# Patient Record
Sex: FEMALE | Race: WHITE | Employment: OTHER | URBAN - METROPOLITAN AREA
[De-identification: names, ages, dates, MRNs, and addresses within clinical notes are randomized per-mention and may not be internally consistent; named-entity substitution may affect disease eponyms.]

---

## 2017-02-22 ENCOUNTER — TELEPHONE (OUTPATIENT)
Dept: MRI IMAGING | Age: 82
End: 2017-02-22

## 2017-02-22 ENCOUNTER — HOSPITAL ENCOUNTER (OUTPATIENT)
Dept: CT IMAGING | Age: 82
Discharge: HOME | End: 2017-02-22
Attending: INTERNAL MEDICINE
Payer: MEDICARE

## 2017-02-22 DIAGNOSIS — R91.1 LUNG NODULE: ICD-10-CM

## 2017-02-22 PROCEDURE — 71250 CT THORAX DX C-: CPT

## 2017-02-28 NOTE — PROGRESS NOTES
This was ordered by Dr. Deras Matters appointment with you on 3/3/17--will defer any orders to you. Thank you.

## 2017-03-03 PROBLEM — R91.8 LUNG NODULES: Status: ACTIVE | Noted: 2017-03-03

## 2017-03-23 ENCOUNTER — HOSPITAL ENCOUNTER (OUTPATIENT)
Dept: PET IMAGING | Age: 82
Discharge: HOME | End: 2017-03-23
Payer: MEDICARE

## 2017-03-23 DIAGNOSIS — Z85.3 HISTORY OF BREAST CANCER: Chronic | ICD-10-CM

## 2017-03-23 DIAGNOSIS — Z85.850 HX OF THYROID CANCER: ICD-10-CM

## 2017-03-23 DIAGNOSIS — R91.1 LUNG NODULE: ICD-10-CM

## 2017-03-23 DIAGNOSIS — R91.1 PULMONARY NODULE, LEFT: Chronic | ICD-10-CM

## 2017-03-23 PROCEDURE — A9552 F18 FDG: HCPCS

## 2017-03-23 PROCEDURE — 74011636320 HC RX REV CODE- 636/320: Performed by: NURSE PRACTITIONER

## 2017-03-23 RX ADMIN — DIATRIZOATE MEGLUMINE AND DIATRIZOATE SODIUM 10 ML: 660; 100 LIQUID ORAL; RECTAL at 09:10

## 2017-03-27 NOTE — PROGRESS NOTES
Dr. Sahra Gabriel, please review PET scan in the 80year old. Previously seen by Dr. Estrella Rosario due to nodules, had chest CT 2/2017 that was ordered by him, showed enlargement, thus PET CT was obtained. Please advise re:  Bx, assuming that she wants that done and is agreeable to tx if found malignant. I believe that she and family wanted to proceed with the knowledge of those possibilities. I will call her with recommendations.

## 2017-05-16 ENCOUNTER — HOSPITAL ENCOUNTER (OUTPATIENT)
Dept: CT IMAGING | Age: 82
Discharge: HOME | End: 2017-05-16
Attending: INTERNAL MEDICINE | Admitting: INTERNAL MEDICINE
Payer: MEDICARE

## 2017-05-16 ENCOUNTER — APPOINTMENT (OUTPATIENT)
Dept: GENERAL RADIOLOGY | Age: 82
End: 2017-05-16
Attending: INTERNAL MEDICINE
Payer: MEDICARE

## 2017-05-16 VITALS
HEIGHT: 60 IN | SYSTOLIC BLOOD PRESSURE: 121 MMHG | BODY MASS INDEX: 26.11 KG/M2 | TEMPERATURE: 97.7 F | WEIGHT: 133 LBS | HEART RATE: 61 BPM | DIASTOLIC BLOOD PRESSURE: 59 MMHG | RESPIRATION RATE: 16 BRPM | OXYGEN SATURATION: 92 %

## 2017-05-16 DIAGNOSIS — R91.1 LUNG NODULE: ICD-10-CM

## 2017-05-16 DIAGNOSIS — R91.8 LUNG NODULES: ICD-10-CM

## 2017-05-16 PROCEDURE — A4648 IMPLANTABLE TISSUE MARKER: HCPCS | Performed by: INTERNAL MEDICINE

## 2017-05-16 PROCEDURE — 74011000250 HC RX REV CODE- 250: Performed by: INTERNAL MEDICINE

## 2017-05-16 PROCEDURE — 88112 CYTOPATH CELL ENHANCE TECH: CPT | Performed by: INTERNAL MEDICINE

## 2017-05-16 PROCEDURE — 31626 BRONCHOSCOPY W/MARKERS: CPT | Performed by: INTERNAL MEDICINE

## 2017-05-16 PROCEDURE — 76040000028: Performed by: INTERNAL MEDICINE

## 2017-05-16 PROCEDURE — 31628 BRONCHOSCOPY/LUNG BX EACH: CPT | Performed by: INTERNAL MEDICINE

## 2017-05-16 PROCEDURE — 77030034962: Performed by: INTERNAL MEDICINE

## 2017-05-16 PROCEDURE — 99153 MOD SED SAME PHYS/QHP EA: CPT | Performed by: INTERNAL MEDICINE

## 2017-05-16 PROCEDURE — 77030031404 HC PTCH SENS SD DISP SPDM -A: Performed by: INTERNAL MEDICINE

## 2017-05-16 PROCEDURE — 77030021921 HC NDL BIOP SD SPDM -E: Performed by: INTERNAL MEDICINE

## 2017-05-16 PROCEDURE — 31623 DX BRONCHOSCOPE/BRUSH: CPT | Performed by: INTERNAL MEDICINE

## 2017-05-16 PROCEDURE — 31654 BRONCH EBUS IVNTJ PERPH LES: CPT | Performed by: INTERNAL MEDICINE

## 2017-05-16 PROCEDURE — 88305 TISSUE EXAM BY PATHOLOGIST: CPT | Performed by: INTERNAL MEDICINE

## 2017-05-16 PROCEDURE — 74011250636 HC RX REV CODE- 250/636: Performed by: INTERNAL MEDICINE

## 2017-05-16 PROCEDURE — 77030028571 HC CATH ENDOBRNCH DISP BIOP KT SPMD -G1: Performed by: INTERNAL MEDICINE

## 2017-05-16 PROCEDURE — 77030012699 HC VLV SUC CNTRL OCOA -A: Performed by: INTERNAL MEDICINE

## 2017-05-16 PROCEDURE — 99152 MOD SED SAME PHYS/QHP 5/>YRS: CPT | Performed by: INTERNAL MEDICINE

## 2017-05-16 PROCEDURE — 31627 NAVIGATIONAL BRONCHOSCOPY: CPT | Performed by: INTERNAL MEDICINE

## 2017-05-16 PROCEDURE — 88173 CYTOPATH EVAL FNA REPORT: CPT | Performed by: INTERNAL MEDICINE

## 2017-05-16 PROCEDURE — 77030021922 HC FCPS BIOP SD DISP SPDM -D: Performed by: INTERNAL MEDICINE

## 2017-05-16 PROCEDURE — 31629 BRONCHOSCOPY/NEEDLE BX EACH: CPT | Performed by: INTERNAL MEDICINE

## 2017-05-16 PROCEDURE — 88172 CYTP DX EVAL FNA 1ST EA SITE: CPT | Performed by: INTERNAL MEDICINE

## 2017-05-16 PROCEDURE — 74011250636 HC RX REV CODE- 250/636

## 2017-05-16 PROCEDURE — 77030009046 HC CATH BRNCH BLLN OCOA -B: Performed by: INTERNAL MEDICINE

## 2017-05-16 PROCEDURE — 88177 CYTP FNA EVAL EA ADDL: CPT | Performed by: INTERNAL MEDICINE

## 2017-05-16 PROCEDURE — 71250 CT THORAX DX C-: CPT

## 2017-05-16 RX ORDER — LIDOCAINE HYDROCHLORIDE 40 MG/ML
SOLUTION TOPICAL AS NEEDED
Status: DISCONTINUED | OUTPATIENT
Start: 2017-05-16 | End: 2017-05-16 | Stop reason: HOSPADM

## 2017-05-16 RX ORDER — SODIUM CHLORIDE 9 MG/ML
25 INJECTION, SOLUTION INTRAVENOUS CONTINUOUS
Status: DISCONTINUED | OUTPATIENT
Start: 2017-05-16 | End: 2017-05-16 | Stop reason: HOSPADM

## 2017-05-16 RX ORDER — SODIUM CHLORIDE 0.9 % (FLUSH) 0.9 %
5-10 SYRINGE (ML) INJECTION AS NEEDED
Status: DISCONTINUED | OUTPATIENT
Start: 2017-05-16 | End: 2017-05-16 | Stop reason: HOSPADM

## 2017-05-16 RX ORDER — SODIUM CHLORIDE 0.9 % (FLUSH) 0.9 %
5-10 SYRINGE (ML) INJECTION EVERY 8 HOURS
Status: DISCONTINUED | OUTPATIENT
Start: 2017-05-16 | End: 2017-05-16 | Stop reason: HOSPADM

## 2017-05-16 RX ORDER — MIDAZOLAM HYDROCHLORIDE 1 MG/ML
.25-5 INJECTION, SOLUTION INTRAMUSCULAR; INTRAVENOUS
Status: DISCONTINUED | OUTPATIENT
Start: 2017-05-16 | End: 2017-05-16 | Stop reason: HOSPADM

## 2017-05-16 RX ORDER — FENTANYL CITRATE 50 UG/ML
50 INJECTION, SOLUTION INTRAMUSCULAR; INTRAVENOUS
Status: DISCONTINUED | OUTPATIENT
Start: 2017-05-16 | End: 2017-05-16 | Stop reason: HOSPADM

## 2017-05-16 RX ORDER — LIDOCAINE HYDROCHLORIDE 20 MG/ML
JELLY TOPICAL AS NEEDED
Status: DISCONTINUED | OUTPATIENT
Start: 2017-05-16 | End: 2017-05-16 | Stop reason: HOSPADM

## 2017-05-16 RX ADMIN — FENTANYL CITRATE 25 MCG: 50 INJECTION, SOLUTION INTRAMUSCULAR; INTRAVENOUS at 10:33

## 2017-05-16 RX ADMIN — SODIUM CHLORIDE 25 ML/HR: 900 INJECTION, SOLUTION INTRAVENOUS at 09:30

## 2017-05-16 RX ADMIN — FENTANYL CITRATE 25 MCG: 50 INJECTION, SOLUTION INTRAMUSCULAR; INTRAVENOUS at 10:06

## 2017-05-16 RX ADMIN — LIDOCAINE HYDROCHLORIDE 7 ML: 40 SOLUTION TOPICAL at 10:07

## 2017-05-16 RX ADMIN — FENTANYL CITRATE 25 MCG: 50 INJECTION, SOLUTION INTRAMUSCULAR; INTRAVENOUS at 10:23

## 2017-05-16 RX ADMIN — FENTANYL CITRATE 25 MCG: 50 INJECTION, SOLUTION INTRAMUSCULAR; INTRAVENOUS at 11:30

## 2017-05-16 RX ADMIN — LIDOCAINE HYDROCHLORIDE 1 ML: 20 JELLY TOPICAL at 10:07

## 2017-05-16 RX ADMIN — FENTANYL CITRATE 25 MCG: 50 INJECTION, SOLUTION INTRAMUSCULAR; INTRAVENOUS at 10:08

## 2017-05-16 RX ADMIN — MIDAZOLAM HYDROCHLORIDE 1 MG: 1 INJECTION, SOLUTION INTRAMUSCULAR; INTRAVENOUS at 11:12

## 2017-05-16 RX ADMIN — MIDAZOLAM HYDROCHLORIDE 1 MG: 1 INJECTION, SOLUTION INTRAMUSCULAR; INTRAVENOUS at 10:06

## 2017-05-16 RX ADMIN — FENTANYL CITRATE 50 MCG: 50 INJECTION, SOLUTION INTRAMUSCULAR; INTRAVENOUS at 10:56

## 2017-05-16 RX ADMIN — FENTANYL CITRATE 50 MCG: 50 INJECTION, SOLUTION INTRAMUSCULAR; INTRAVENOUS at 11:23

## 2017-05-16 RX ADMIN — FENTANYL CITRATE 25 MCG: 50 INJECTION, SOLUTION INTRAMUSCULAR; INTRAVENOUS at 11:07

## 2017-05-16 NOTE — DISCHARGE INSTRUCTIONS
RESPIRATORY CARE - BRONCHOSCOPY - DISCHARGE INSTRUCTIONS      You received a lot of numbing medication for your throat and nose, and you also received medication to make you sleepy during your procedure. Because of this and because the bronchoscopy may have irritated your airways, we ask that you follow these directions:    1. Do not eat or drink until  1300 . After that, you may have what you please. You may want to try some liquids first, because your throat may be a little sore. 2. Medication may cause drowsiness for several hours, therefore, do not drive or                  operate machinery for remainder of the day. No alcohol today. 3. You may cough up more mucus than usual and you may see some blood, but                   this is expected and should subside by the following day. 4. If severe throat irritation, coughing, or bleeding continue, call your doctor. 5.         If you run a fever greater than 102, call [facility] at [phone]. 6.         Dr. Deloras Felty has asked that you:                A. Call the doctor's office at [phone] for plan of care on Monday. Dr Stoney Morgan is going to try to discuss your plan at tumor board tomorrow. B. Call [phone] with problems. Discharge Medications:         Any additional instructions:  Motrin or tylenol for fever.     Instructions given to [facility] and other family members  Instructions given by:  RT Dianne

## 2017-05-16 NOTE — PROCEDURES
PROCEDURE  Bronchoscopy with Endobronchial Ultrasound Guided Fine Needle Aspiration Of Medistinal Lymph nodes and airway inspection and EMN aided biopsy of peripheral lung nodule. EQUIPEMENT:  Olympus T180 bronchoscope  Super Dimension EMN system  90 deg steareble sheath  Olympus [id number] EBUS scope  UM 17 Radial probe  Super D forceps  Super D 21G needle  Super D triple needle brush  Fluoroscopy    INDICATION   Peripheral nodules suspicious for malignancy/staging of presumed malignancy    Posterior segment CRISTÓBAL:            Lungula:      POST OP DIAGNOSIS:  Super Dimension EMN system was employed to identify and biopsy the CRISTÓBAL nodulesmass seen on CT/PET. 10 forceps biopsies, 1 triple needle brushings and  2 needle biopsies  were performed with touch preparations revealing blood on KEILA. Histology from obtained  Biopsies is pending. The Lingular nodule was accessed however using real time US the nodule was very close to the heart (heart seen as soon as probe extended through sheath) thus due to risk of complications a biopsy from the lingular PET positive nodule was not performed. Fiducial markers were then placed around both nodules(see separate note). Following EMN procedure, linear EBUS was performed for mediastinal staging. Stations 11Rs, 4R,10R,7,4L,2R and 2L as well as 11 and 10 L were inspected and had no targets for biopsy with the largest LN seen on station 4L at 2mm/3 mm in size. Patient's case will be discussed in MDTTB. ANESTHESIA  Concious sedation with: Fentanyl 250 mcg IV; Versed 2 mg IV; Lidocaine 200 mg to tracheo-bronchial tree and vocal cords. AIRWAY INSPECTION  After obtaining informed consent, using a bite block, an Olympus Q 180 viedeo bronchoscope was  introduced into the trachea through the vocal cords without complication.     RIGHT  LOCATION NORM/ABNORMAL DESCRIPTION   VOCAL CORDS NL    TRACHEA NL    RONNY NL    RMSB NL    RUL NL    BI NL    RML NL    SUP SEGM RLL NL    MED BASAL NL    ANTERIOR BASAL NL    LATERAL BASAL NL    POSTERIOR BASAL NL                  LEFT  LOCATION NORMAL/ABNORMAL TYPE   LMSB NL    CRISTÓBAL NL    LINGULA NL    SUPERIOR DIVISION NL    SUPERIOR SEG LLL NL    CAMELIA-MEDIAL LLL NL    LATERAL LLL NL    POSTERIOR LLL NL                         EBUS  After completing the airway inspection an Olympus  [address] F EBUS bronchoscope was introduced into the trachea through the vocal chords without complication. The balloon was inflated with saline and a mediastinal inspection commenced:      STATION SIZE IN CM   11R sup No tgt   11R inf \"   10R \"   7 \"   4R \"   11L \"   10L \"   4L 0.2/0.3   2L \"   2R \"     There were no targets for biopsy                     Olympus T 180   bronchoscope wasintroduced into the airways to identify and biopsy the CRISTÓBAL nodules with the aid of Super D EMN system and radial probe US:      CT image was acquired on with Super D protocol were used and the pathway to target  planned using super D planning software. Planing data was then used to navigate to the nodule, after verification with radial US:    Post seg CRISTÓBAL nodule      The Lingular nodule was not well visualized on radial probe US- the heart was clearly seen on radial probe as soon as probe was extended out of the sheath- biopsies from here were not performed due to high risk of complications. 10 forceps biopsies, 1 triple needle brushings and  2 needle biopsies  were performed with touch preparations revealing blood on KEILA.   Histology from obtained  Biopsies is pending      TOUCH PREP BIOPSY# MALIGNANT SUSPICIOUS ATYPIA NONDGNSTC   Posterior seg CRISTÓBAL nodule forceps 1 - - - blood    2 - - - \"    3 - - - \"    4 - - - \"    5 - - - \"    6 - - - \"    7 - - - \"    8 - - - \"    9 - - - \"    10 - - - \"           Posterior seg CRISTÓBAL nodule  FNA 1 - - - \"    2 - - - \"           Posterior seg CRISTÓBAL nodule  Triple needle brush 1 -- -- -- In toto for cytology             EBL: 10 ml    Complications: none with no evidence of PTX on post op chest fluoroscopy    Nathaniel Kaye MD.

## 2017-05-16 NOTE — IP AVS SNAPSHOT
Current Discharge Medication List  
  
ASK your doctor about these medications Dose & Instructions Dispensing Information Comments Morning Noon Evening Bedtime  
 amiodarone 100 mg tablet Commonly known as:  Nicole Walters Your last dose was: Your next dose is: TAKE 1 TABLET DAILY Quantity:  90 Tab Refills:  3  
     
   
   
   
  
 amLODIPine 5 mg tablet Commonly known as:  Tiffany Fast Your last dose was: Your next dose is:    
   
   
 Dose:  5 mg Take 5 mg by mouth daily. Refills:  0  
     
   
   
   
  
 BONIVA 150 mg tablet Generic drug:  ibandronate Your last dose was: Your next dose is:    
   
   
 Dose:  150 mg Take 150 mg by mouth every thirty (30) days. Refills:  0  
     
   
   
   
  
 CALCIUM + D PO Your last dose was: Your next dose is: Take  by mouth. Unknown dose: 1 tab po tid Refills:  0  
     
   
   
   
  
 dextromethorphan-guaiFENesin  mg/5 mL syrup Commonly known as:  ROBITUSSIN-DM Your last dose was: Your next dose is:    
   
   
 Dose:  10 mL Take 10 mL by mouth every four (4) hours as needed for Cough. Refills:  0  
     
   
   
   
  
 ferrous sulfate 325 mg (65 mg iron) tablet Your last dose was: Your next dose is: Take  by mouth Daily (before breakfast). Refills:  0  
     
   
   
   
  
 indapamide 2.5 mg tablet Commonly known as:  Verdis Gola Your last dose was: Your next dose is:    
   
   
 Dose:  2.5 mg Take 2.5 mg by mouth two (2) times a day. Refills:  0 LUTEIN PO Your last dose was: Your next dose is:    
   
   
 Dose:  10 mg Take 10 mg by mouth daily. 1 tab po qd Refills:  0  
     
   
   
   
  
 magnesium oxide 400 mg tablet Commonly known as:  MAG-OX Your last dose was: Your next dose is: TAKE 1 TABLET TWICE DAILY. Quantity:  180 Tab Refills:  3  
     
   
   
   
  
 multivitamin tablet Commonly known as:  ONE A DAY Your last dose was: Your next dose is:    
   
   
 Dose:  1 Tab Take 1 Tab by mouth daily. Refills:  0  
     
   
   
   
  
 potassium chloride SR 10 mEq tablet Commonly known as:  KLOR-CON 10 Your last dose was: Your next dose is:    
   
   
 Dose:  10 mEq Take 1 Tab by mouth two (2) times a day. Quantity:  60 Tab Refills:  5 SYNTHROID 150 mcg tablet Generic drug:  levothyroxine Your last dose was: Your next dose is:    
   
   
 Dose:  150 mcg Take 150 mcg by mouth Daily (before breakfast). Refills:  0

## 2017-05-16 NOTE — IP AVS SNAPSHOT
[address] 
[phone] Patient: Ally Dennison MRN: [id number] You are allergic to the following No active allergies Recent Documentation Height Weight Breastfeeding? BMI OB Status Smoking Status 1.524 m 60.3 kg No 25.97 kg/m2 Postmenopausal Never Smoker Emergency Contacts Name Discharge Info Relation Home Work Mobile Lizbet Sim DISCHARGE CAREGIVER [3] Child [2] [phone] About your hospitalization You were admitted on:  May 16, 2017 You last received care in the:  [facility] You were discharged on:  May 16, 2017 Unit phone number:  [phone] Why you were hospitalized Your primary diagnosis was:  Not on File Providers Seen During Your Hospitalizations Provider Role Specialty Primary office phone Nola Cornelius MD Attending Provider Pulmonary Disease [phone] Your Primary Care Physician (PCP) Primary Care Physician Office Phone Office Fax Braeden Segovia [phone] Follow-up Information None Current Discharge Medication List  
  
ASK your doctor about these medications Dose & Instructions Dispensing Information Comments Morning Noon Evening Bedtime  
 amiodarone 100 mg tablet Commonly known as:  Sue Molina Your last dose was: Your next dose is: TAKE 1 TABLET DAILY Quantity:  90 Tab Refills:  3  
     
   
   
   
  
 amLODIPine 5 mg tablet Commonly known as:  Eden Oneil Your last dose was: Your next dose is:    
   
   
 Dose:  5 mg Take 5 mg by mouth daily. Refills:  0  
     
   
   
   
  
 BONIVA 150 mg tablet Generic drug:  ibandronate Your last dose was: Your next dose is:    
   
   
 Dose:  150 mg Take 150 mg by mouth every thirty (30) days. Refills:  0 CALCIUM + D PO Your last dose was: Your next dose is: Take  by mouth. Unknown dose: 1 tab po tid Refills:  0  
     
   
   
   
  
 dextromethorphan-guaiFENesin  mg/5 mL syrup Commonly known as:  ROBITUSSIN-DM Your last dose was: Your next dose is:    
   
   
 Dose:  10 mL Take 10 mL by mouth every four (4) hours as needed for Cough. Refills:  0  
     
   
   
   
  
 ferrous sulfate 325 mg (65 mg iron) tablet Your last dose was: Your next dose is: Take  by mouth Daily (before breakfast). Refills:  0  
     
   
   
   
  
 indapamide 2.5 mg tablet Commonly known as:  Dwayne Naas Your last dose was: Your next dose is:    
   
   
 Dose:  2.5 mg Take 2.5 mg by mouth two (2) times a day. Refills:  0 LUTEIN PO Your last dose was: Your next dose is:    
   
   
 Dose:  10 mg Take 10 mg by mouth daily. 1 tab po qd Refills:  0  
     
   
   
   
  
 magnesium oxide 400 mg tablet Commonly known as:  MAG-OX Your last dose was: Your next dose is: TAKE 1 TABLET TWICE DAILY. Quantity:  180 Tab Refills:  3  
     
   
   
   
  
 multivitamin tablet Commonly known as:  ONE A DAY Your last dose was: Your next dose is:    
   
   
 Dose:  1 Tab Take 1 Tab by mouth daily. Refills:  0  
     
   
   
   
  
 potassium chloride SR 10 mEq tablet Commonly known as:  KLOR-CON 10 Your last dose was: Your next dose is:    
   
   
 Dose:  10 mEq Take 1 Tab by mouth two (2) times a day. Quantity:  60 Tab Refills:  5 SYNTHROID 150 mcg tablet Generic drug:  levothyroxine Your last dose was: Your next dose is:    
   
   
 Dose:  150 mcg Take 150 mcg by mouth Daily (before breakfast). Refills:  0 Discharge Instructions RESPIRATORY CARE - BRONCHOSCOPY - DISCHARGE INSTRUCTIONS You received a lot of numbing medication for your throat and nose, and you also received medication to make you sleepy during your procedure. Because of this and because the bronchoscopy may have irritated your airways, we ask that you follow these directions: 1. Do not eat or drink until  1300 . After that, you may have what you please. You may want to try some liquids first, because your throat may be a little sore. 2. Medication may cause drowsiness for several hours, therefore, do not drive or                  operate machinery for remainder of the day. No alcohol today. 3. You may cough up more mucus than usual and you may see some blood, but                   this is expected and should subside by the following day. 4. If severe throat irritation, coughing, or bleeding continue, call your doctor. 5.         If you run a fever greater than 102, call [facility] at [phone]. 6.         Dr. Roge Mansfield has asked that you: A. Call the doctor's office at [phone] for plan of care on Monday. Dr Mando Pierce is going to try to discuss your plan at tumor board tomorrow. B. Call [phone] with problems. Discharge Medications: 
 
  
 
Any additional instructions:  Motrin or tylenol for fever. Instructions given to [facility] and other family members Instructions given by:  RT Zuri Discharge Orders None Introducing [facility]! [employer] introduces [employer] patient portal. Now you can access parts of your medical record, email your doctor's office, and request medication refills online. 1. In your internet browser, go to [email or web address] 2. Click on the First Time User? Click Here link in the Sign In box. You will see the New Member Sign Up page. 3. Enter your [employer] Access Code exactly as it appears below.  You will not need to use this code after youve completed the sign-up process. If you do not sign up before the expiration date, you must request a new code. · [employer] Access Code: [email or web address] Expires: 6/4/2017  5:04 PM 
 
4. Enter the last four digits of your Social Security Number (xxxx) and Date of Birth (mm/dd/yyyy) as indicated and click Submit. You will be taken to the next sign-up page. 5. Create a [employer] ID. This will be your [employer] login ID and cannot be changed, so think of one that is secure and easy to remember. 6. Create a [employer] password. You can change your password at any time. 7. Enter your Password Reset Question and Answer. This can be used at a later time if you forget your password. 8. Enter your e-mail address. You will receive e-mail notification when new information is available in [address] 9. Click Sign Up. You can now view and download portions of your medical record. 10. Click the Download Summary menu link to download a portable copy of your medical information. If you have questions, please visit the Frequently Asked Questions section of the [employer] website. Remember, [employer] is NOT to be used for urgent needs. For medical emergencies, dial 911. Now available from your iPhone and Android! General Information Please provide this summary of care documentation to your next provider. Patient Signature:  ____________________________________________________________ Date:  ____________________________________________________________  
  
Pritesh End Provider Signature:  ____________________________________________________________ Date:  ____________________________________________________________

## 2017-05-16 NOTE — PROCEDURES
PROCEDURE  Bronchoscopy with ENB and  Endobronchial Ultrasound Guided placement of fiducial markers for stereotactic radiation/cyberknife therapy. INDICATION   Peripheral  POSTERIOR CRISTÓBAL AND LINGULAR NODULES  PET positive and susppicious for malignancy- patient not a good surgical candidate at age 80 with physical debility    EQUIPEMENT:  Olympus T180 bronchoscope  Super Dimension EMN system  90 deg steareble sheath  UM 17 Radial probe  SuperLock Cobra fiducial marker x 8 (4 per nodule)  Fluoroscopy    ANESTHESIA    Concious sedation with: Fentanyl 250 mcg IV; Versed 2 mg IV; Lidocaine 200 mg to tracheo-bronchial tree and vocal cords  Summary    After obtaining informed consent, using a bite block, an Olympus T 180 video bronchoscope was  introduced into the trachea through the vocal cords without complication. CT images  with Super D protocol were used and the pathways to target and fiducial marker placement locations were  planned using super D planning software. With the aid of Super D EMN system and radial probe US planing data was then used to navigate to the nodules, after verification with radial US. Fiducial markers were then inserted in the usual fashion using deployment sheath under fluoroscopic guidance, in 3 separate planes with one marker placed within the lesion. Procedure was performed for each nodule separately    After excluding PTX with the aid of fluoroscopy , the procedure was completed.       EBL: none    Coplications: none    Leroy Majano MD.

## 2017-07-25 ENCOUNTER — HOSPITAL ENCOUNTER (OUTPATIENT)
Dept: RADIATION ONCOLOGY | Age: 82
Discharge: HOME | End: 2017-07-25
Payer: MEDICARE

## 2017-07-25 VITALS
RESPIRATION RATE: 18 BRPM | SYSTOLIC BLOOD PRESSURE: 147 MMHG | WEIGHT: 135.6 LBS | BODY MASS INDEX: 24.8 KG/M2 | DIASTOLIC BLOOD PRESSURE: 64 MMHG | TEMPERATURE: 97.5 F | HEART RATE: 67 BPM

## 2017-07-25 DIAGNOSIS — C34.90 MALIGNANT NEOPLASM OF LUNG, UNSPECIFIED LATERALITY, UNSPECIFIED PART OF LUNG (HCC): Primary | ICD-10-CM

## 2017-07-25 PROCEDURE — 99211 OFF/OP EST MAY X REQ PHY/QHP: CPT

## 2017-07-25 NOTE — PROGRESS NOTES
Pt here today for one month FUP post Cyberknife to the lung at [facility].  Pt denies chest pain or a cough. No c/o today.

## 2017-07-25 NOTE — PROGRESS NOTES
Patient: Mariajose Wheat MRN: [id number]  SSN: [id number]    YOB: 1925  Age: 80 y.o. Sex: female      Other Providers:  Jessica Posadas MD    CHIEF COMPLAINT: Lung nodules    DIAGNOSIS: pulmonary nodules presumed to be malignant    SITE TREATED AND DOSE DELIVERED: The left upper lobe nodule and lingular nodule each received 5000 cGy in 5 fractions of 1000 cGy each using CyberKnife SBRT. TREATMENT DATES: 06/12/17 through 06/23/17. HISTORY OF PRESENT ILLNESS:  Mariajose Wheat is a 80 y.o. female who I am seeing at the request of Dr. Sharon Turk. The patient has a history of thyroid and breast cancers. She has a long history of lung nodules that have been followed for at least the past 4 years by Dr. Puaj Abdul. She has undergone serial CT scanning and has been found to have a left upper lobe nodule and a lingular nodule. She has not had any pulmonary complaints. The lung nodules have been essentially stable dating back to 2013. However, chest CT on 02/18/16 showed slight increase compared to 2013. Chest CT on 02/22/17 showed continued enlargement of the largest nodule located in the left upper lobe now measuring 11 mm compared to 9.6 mm on prior scan. Multiple additional bilateral pulmonary nodules were seen, but they were felt not to have changed significantly. PET/CT scan on 03/23/17 showed the dominant left upper pole lobe pulmonary nodule to have elevated FDG activity with a maximum SUV of 3.7. The left lingular nodule also demonstrated mildly elevated FDG activity with maximum SUV of 1.9. These were felt to be suggestive of a neoplastic process. No abnormal FDG uptake was seen outside the chest.  On 05/16/17 she underwent navigational bronchoscopy with biopsy of the posterior segment left upper lobe nodule that was nondiagnostic. The lingular nodule was not biopsied due to its proximity to the heart.   Fiducial markers were placed around both nodules in anticipation of possible SBRT with CyberKnife. Her case was presented at multidisciplinary thoracic tumor board and recommendation was for consideration of SBRT. She went on to receive CyberKnife SBRT delivering 5000 cGy in 5 fractions of 1000 cGy each from 06/12/17 through 06/23/17. She tolerated treatment without significant difficulty. INTERVAL HISTORY: Ms. Carol Pereira returns for follow-up 1 month after completion of CyberKnife SBRT to 2 left lung lesions. She tolerated treatment without acute side effects. At this time, Ms. Carol Pereira is doing very well. She denies shortness of breath and cough. She is at her baseline with regard to pulmonary function. PAST MEDICAL HISTORY:    Past Medical History:   Diagnosis Date    Arrhythmia 7/27/2016    Arthritis     Benign hypertensive heart disease without CHF 5/24/2016    Cancer [facility])     History of thyroid cancer and breast cancer.  Cardiac Arrhythmia, SVT 5/24/2016    History of breast cancer 5/9/2014    Hx of thyroid cancer 5/9/2014    S/p thyroidectomy     Hypertension     Hypo-osmolality and hyponatremia 5/24/2016    Hypomagnesemia 5/24/2016    Hypothyroidism 5/24/2016    Pulmonary nodule, left 5/15/2013    Left lower lobe spheres segment. A 7 mm on [facility] scan December 2012. 1.8 S UV on PET scan. 6 mm on follow up CT May 2013.  Radiation therapy complication          PAST SURGICAL HISTORY:   Past Surgical History:   Procedure Laterality Date    HX BREAST BIOPSY      HX BREAST LUMPECTOMY      HX CHOLECYSTECTOMY      HX THYROIDECTOMY      X2 for cancer       MEDICATIONS:     Current Outpatient Prescriptions:     amiodarone (PACERONE) 100 mg tablet, TAKE 1 TABLET DAILY, Disp: 90 Tab, Rfl: 3    magnesium oxide (MAG-OX) 400 mg tablet, TAKE 1 TABLET TWICE DAILY. , Disp: 180 Tab, Rfl: 3    dextromethorphan-guaiFENesin (ROBITUSSIN-DM)  mg/5 mL syrup, Take 10 mL by mouth every four (4) hours as needed for Cough. , Disp: , Rfl:     potassium chloride SR (KLOR-CON 10) 10 mEq tablet, Take 1 Tab by mouth two (2) times a day., Disp: 60 Tab, Rfl: 5    levothyroxine (SYNTHROID) 150 mcg tablet, Take 150 mcg by mouth Daily (before breakfast). , Disp: , Rfl:     indapamide (LOZOL) 2.5 mg tablet, Take 2.5 mg by mouth two (2) times a day., Disp: , Rfl:     amLODIPine (NORVASC) 5 mg tablet, Take 5 mg by mouth daily. , Disp: , Rfl:     LUTEIN PO, Take 10 mg by mouth daily. 1 tab po qd, Disp: , Rfl:     CALCIUM CARBONATE/VITAMIN D3 (CALCIUM + D PO), Take  by mouth. Unknown dose: 1 tab po tid, Disp: , Rfl:     multivitamin (ONE A DAY) tablet, Take 1 Tab by mouth daily. , Disp: , Rfl:     ALLERGIES:   No Known Allergies    SOCIAL HISTORY:   Social History     Social History    Marital status:      Spouse name: N/A    Number of children: N/A    Years of education: N/A     Occupational History    Not on file. Social History Main Topics    Smoking status: Never Smoker    Smokeless tobacco: Never Used    Alcohol use No    Drug use: No    Sexual activity: No     Other Topics Concern    Not on file     Social History Narrative       FAMILY HISTORY:   History reviewed. No pertinent family history. REVIEW OF SYSTEMS: Please see the completed review of systems sheet in the chart that I have reviewed today. PHYSICAL EXAMINATION:   ECOG Performance status 1  VITAL SIGNS:   Visit Vitals    /64    Pulse 67    Temp 97.5 °F (36.4 °C)    Resp 18    Wt 61.5 kg (135 lb 9.6 oz)    BMI 24.8 kg/m2        GENERAL: The patient is well-developed, ambulatory, alert and in no acute distress. HEENT: Head is normocephalic, atraumatic. Pupils are equal, round and reactive to light and accommodation. Extraocular movement intact. NECK: Neck is supple with no masses. CARDIOVASCULAR: Heart has regular rate and rhythm. There is a 2/6 systolic ejection murmur best heard at the left sternal border.  Radial pulses are 2+ RESPIRATORY: Lungs are clear to auscultation and percussion. There is normal respiratory effort. LYMPHATIC: There is no cervical or supraclavicular lymphadenopathy bilaterally. MUSCULOSKELETAL: Extremities reveal no cyanosis, clubbing or edema.  is 5+/5. NEURO:  Cranial nerves II-XII grossly intact. Muscular strength and sensation are intact throughout all four extremities. PATHOLOGY:    As detailed in HPI. LABORATORY:   Lab Results   Component Value Date/Time    Sodium 137 02/18/2016 11:30 AM    Potassium 3.7 02/18/2016 11:30 AM    Chloride 97 02/18/2016 11:30 AM    CO2 32 02/18/2016 11:30 AM    Anion gap 8 02/18/2016 11:30 AM    Glucose 109 02/18/2016 11:30 AM    BUN 18 02/18/2016 11:30 AM    Creatinine 0.87 02/18/2016 11:30 AM    GFR est AA >60 02/18/2016 11:30 AM    GFR est non-AA >60 02/18/2016 11:30 AM    Calcium 9.5 02/18/2016 11:30 AM    Magnesium 2.0 07/06/2015 11:50 AM    Albumin 3.4 01/25/2016 03:28 PM    Protein, total 7.6 01/25/2016 03:28 PM    Globulin 4.2 01/25/2016 03:28 PM    A-G Ratio 0.8 01/25/2016 03:28 PM    AST (SGOT) 18 01/25/2016 03:28 PM    ALT (SGPT) 23 01/25/2016 03:28 PM     Lab Results   Component Value Date/Time    WBC 5.7 05/11/2014 05:25 AM    HGB 14.7 05/11/2014 05:25 AM    HCT 42.2 05/11/2014 05:25 AM    PLATELET 248 89/96/2263 05:25 AM       RADIOLOGY:    None      IMPRESSION:  Gelacio Martinez is a 80 y.o. female with 2 left lung nodules presumed to be malignant. She is now 1 month s/p CyberKnife SBRT to the 2 lesions. COUNSELING AND COORDINATION OF CARE: I have had a 20 minute follow-up with Ms. Susy Vargas of which greater than half has been spent counseling her about continued management of her lung nodules. She tolerated  SBRT without difficulty. Her breathing is at baseline. She will undergo CT scan of the chest in 1 month and then return for follow-up.         Kaitlyn Sanchez MD   July 25, 2017

## 2017-07-26 DIAGNOSIS — C34.90 MALIGNANT NEOPLASM OF LUNG, UNSPECIFIED LATERALITY, UNSPECIFIED PART OF LUNG (HCC): Primary | ICD-10-CM

## 2017-08-14 ENCOUNTER — HOSPITAL ENCOUNTER (OUTPATIENT)
Dept: LAB | Age: 82
Discharge: HOME | End: 2017-08-14
Attending: PREVENTIVE MEDICINE
Payer: MEDICARE

## 2017-08-14 DIAGNOSIS — E83.42 HYPOMAGNESEMIA: ICD-10-CM

## 2017-08-14 DIAGNOSIS — I47.1 SUPRAVENTRICULAR TACHYCARDIA (HCC): ICD-10-CM

## 2017-08-14 DIAGNOSIS — Z79.899 ON AMIODARONE THERAPY: ICD-10-CM

## 2017-08-14 LAB
MAGNESIUM SERPL-MCNC: 2.5 MG/DL (ref 1.8–2.4)
T4 FREE SERPL-MCNC: 1.8 NG/DL (ref 0.78–1.46)
TSH SERPL DL<=0.005 MIU/L-ACNC: 1.24 UIU/ML (ref 0.36–3.74)

## 2017-08-14 PROCEDURE — 84439 ASSAY OF FREE THYROXINE: CPT | Performed by: INTERNAL MEDICINE

## 2017-08-14 PROCEDURE — 36415 COLL VENOUS BLD VENIPUNCTURE: CPT | Performed by: INTERNAL MEDICINE

## 2017-08-14 PROCEDURE — 84443 ASSAY THYROID STIM HORMONE: CPT | Performed by: INTERNAL MEDICINE

## 2017-08-14 PROCEDURE — 83735 ASSAY OF MAGNESIUM: CPT | Performed by: INTERNAL MEDICINE

## 2017-08-14 NOTE — PROGRESS NOTES
Please call patient. Labs good. Magnesium a little high. Decrease magnesium oxide to 400 mg once a day.     Thank you

## 2017-08-23 ENCOUNTER — HOSPITAL ENCOUNTER (OUTPATIENT)
Dept: CT IMAGING | Age: 82
Discharge: HOME | End: 2017-08-23
Attending: RADIOLOGY
Payer: MEDICARE

## 2017-08-23 VITALS — WEIGHT: 134 LBS | BODY MASS INDEX: 26.31 KG/M2 | HEIGHT: 60 IN

## 2017-08-23 DIAGNOSIS — C34.90 MALIGNANT NEOPLASM OF LUNG, UNSPECIFIED LATERALITY, UNSPECIFIED PART OF LUNG (HCC): ICD-10-CM

## 2017-08-23 PROCEDURE — 71250 CT THORAX DX C-: CPT

## 2017-08-29 ENCOUNTER — HOSPITAL ENCOUNTER (OUTPATIENT)
Dept: RADIATION ONCOLOGY | Age: 82
Discharge: HOME | End: 2017-08-29
Payer: MEDICARE

## 2017-08-29 VITALS
SYSTOLIC BLOOD PRESSURE: 129 MMHG | TEMPERATURE: 97.7 F | HEART RATE: 63 BPM | DIASTOLIC BLOOD PRESSURE: 65 MMHG | OXYGEN SATURATION: 98 % | BODY MASS INDEX: 26.87 KG/M2 | WEIGHT: 137.6 LBS

## 2017-08-29 DIAGNOSIS — C34.12 MALIGNANT NEOPLASM OF UPPER LOBE OF LEFT LUNG (HCC): Primary | ICD-10-CM

## 2017-08-29 PROCEDURE — 99211 OFF/OP EST MAY X REQ PHY/QHP: CPT

## 2017-08-29 NOTE — PROGRESS NOTES
Patient: Zaki Chin MRN: [id number]  SSN: [id number]    YOB: 1925  Age: 80 y.o. Sex: female      Other Providers:  Kassy Parada MD    CHIEF COMPLAINT: Lung nodules    DIAGNOSIS: Pulmonary nodules presumed to be malignant    SITE TREATED AND DOSE DELIVERED: The left upper lobe nodule and lingular nodule each received 5000 cGy in 5 fractions of 1000 cGy each using CyberKnife SBRT. TREATMENT DATES: 06/12/17 through 06/23/17. HISTORY OF PRESENT ILLNESS:  Zaki Chin is a 80 y.o. female who I am seeing at the request of Dr. Bharat Ferrari. The patient has a history of thyroid and breast cancers. She has a long history of lung nodules that have been followed for at least the past 4 years by Dr. Shaw Baxter. She has undergone serial CT scanning and has been found to have a left upper lobe nodule and a lingular nodule. She has not had any pulmonary complaints. The lung nodules have been essentially stable dating back to 2013. However, chest CT on 02/18/16 showed slight increase compared to 2013. Chest CT on 02/22/17 showed continued enlargement of the largest nodule located in the left upper lobe now measuring 11 mm compared to 9.6 mm on prior scan. Multiple additional bilateral pulmonary nodules were seen, but they were felt not to have changed significantly. PET/CT scan on 03/23/17 showed the dominant left upper pole lobe pulmonary nodule to have elevated FDG activity with a maximum SUV of 3.7. The left lingular nodule also demonstrated mildly elevated FDG activity with maximum SUV of 1.9. These were felt to be suggestive of a neoplastic process. No abnormal FDG uptake was seen outside the chest.  On 05/16/17 she underwent navigational bronchoscopy with biopsy of the posterior segment left upper lobe nodule that was nondiagnostic. The lingular nodule was not biopsied due to its proximity to the heart.   Fiducial markers were placed around both nodules in anticipation of possible SBRT with CyberKnife. Her case was presented at multidisciplinary thoracic tumor board and recommendation was for consideration of SBRT. She went on to receive CyberKnife SBRT delivering 5000 cGy in 5 fractions of 1000 cGy each from 06/12/17 through 06/23/17. She tolerated treatment without significant difficulty. INTERVAL HISTORY: Ms. Allyssa Servin returns for follow-up 1 month after completion of CyberKnife SBRT to 2 left lung lesions. She tolerated treatment without acute side effects. CT scan of the chest on 08/23/17 showed the nodule in the left upper lobe just lateral to the superior hilum to now measure 10 x 8 mm compared with 11 x 9 mm on prior exam.  The nodule in the lingula was smaller, now measuring 8 x 6 mm compared to 9 x 9 mm on prior exam.  2 stable 3 mm nodules were again seen in the right middle lobe. A stable 4 mm nodule seen in the right lower lobe. No new or enlarging pulmonary masses were seen. At this time, Ms. Allyssa Servin is doing very well. She denies shortness of breath and cough. She is at her baseline with regard to pulmonary function. PAST MEDICAL HISTORY:    Past Medical History:   Diagnosis Date    Arrhythmia 7/27/2016    Arthritis     Benign hypertensive heart disease without CHF 5/24/2016    Cancer [facility])     History of thyroid cancer and breast cancer.  Cardiac Arrhythmia, SVT 5/24/2016    History of breast cancer 5/9/2014    Hx of thyroid cancer 5/9/2014    S/p thyroidectomy     Hypertension     Hypo-osmolality and hyponatremia 5/24/2016    Hypomagnesemia 5/24/2016    Hypothyroidism 5/24/2016    Pulmonary nodule, left 5/15/2013    Left lower lobe spheres segment. A 7 mm on [facility] scan December 2012. 1.8 S UV on PET scan. 6 mm on follow up CT May 2013.      Radiation therapy complication          PAST SURGICAL HISTORY:   Past Surgical History:   Procedure Laterality Date    HX BREAST BIOPSY      HX BREAST LUMPECTOMY      HX CHOLECYSTECTOMY      HX THYROIDECTOMY      X2 for cancer       MEDICATIONS:     Current Outpatient Prescriptions:     magnesium oxide (MAG-OX) 400 mg tablet, Take 1 Tab by mouth two (2) times a day. (Patient taking differently: Take 400 mg by mouth daily.), Disp: 180 Tab, Rfl: 3    amiodarone (PACERONE) 100 mg tablet, TAKE 1 TABLET DAILY, Disp: 90 Tab, Rfl: 3    dextromethorphan-guaiFENesin (ROBITUSSIN-DM)  mg/5 mL syrup, Take 10 mL by mouth every four (4) hours as needed for Cough. , Disp: , Rfl:     potassium chloride SR (KLOR-CON 10) 10 mEq tablet, Take 1 Tab by mouth two (2) times a day. (Patient taking differently: Take 20 mEq by mouth two (2) times a day.), Disp: 60 Tab, Rfl: 5    levothyroxine (SYNTHROID) 150 mcg tablet, Take 150 mcg by mouth Daily (before breakfast). , Disp: , Rfl:     indapamide (LOZOL) 2.5 mg tablet, Take 2.5 mg by mouth two (2) times a day., Disp: , Rfl:     amLODIPine (NORVASC) 5 mg tablet, Take 5 mg by mouth daily. , Disp: , Rfl:     LUTEIN PO, Take 10 mg by mouth daily. 1 tab po qd, Disp: , Rfl:     CALCIUM CARBONATE/VITAMIN D3 (CALCIUM + D PO), Take  by mouth. 800 mg Calcium/2000 units vitamin D daily  Indications: with magnesium, Disp: , Rfl:     multivitamin (ONE A DAY) tablet, Take 1 Tab by mouth daily. , Disp: , Rfl:     ALLERGIES:   No Known Allergies    SOCIAL HISTORY:   Social History     Social History    Marital status:      Spouse name: N/A    Number of children: N/A    Years of education: N/A     Occupational History    Not on file. Social History Main Topics    Smoking status: Never Smoker    Smokeless tobacco: Never Used    Alcohol use No    Drug use: No    Sexual activity: No     Other Topics Concern    Not on file     Social History Narrative       FAMILY HISTORY:   No family history on file. REVIEW OF SYSTEMS: Please see the completed review of systems sheet in the chart that I have reviewed today.       PHYSICAL EXAMINATION:   ECOG Performance status 1  VITAL SIGNS:   Visit Vitals    /65    Pulse 63    Temp 97.7 °F (36.5 °C)    Wt 62.4 kg (137 lb 9.6 oz)    SpO2 98%    BMI 26.87 kg/m2        GENERAL: The patient is well-developed, ambulatory, alert and in no acute distress. HEENT: Head is normocephalic, atraumatic. Pupils are equal, round and reactive to light and accommodation. Extraocular movement intact. NECK: Neck is supple with no masses. CARDIOVASCULAR: Heart has regular rate and rhythm. There is a 2/6 systolic ejection murmur best heard at the left sternal border. Radial pulses are 2+ RESPIRATORY: Lungs are clear to auscultation and percussion. There is normal respiratory effort. LYMPHATIC: There is no cervical or supraclavicular lymphadenopathy bilaterally. MUSCULOSKELETAL: Extremities reveal no cyanosis, clubbing or edema.  is 5+/5. NEURO:  Cranial nerves II-XII grossly intact. Muscular strength and sensation are intact throughout all four extremities. PATHOLOGY:    As detailed in HPI.     LABORATORY:   Lab Results   Component Value Date/Time    Sodium 137 02/18/2016 11:30 AM    Potassium 3.7 02/18/2016 11:30 AM    Chloride 97 02/18/2016 11:30 AM    CO2 32 02/18/2016 11:30 AM    Anion gap 8 02/18/2016 11:30 AM    Glucose 109 02/18/2016 11:30 AM    BUN 18 02/18/2016 11:30 AM    Creatinine 0.87 02/18/2016 11:30 AM    GFR est AA >60 02/18/2016 11:30 AM    GFR est non-AA >60 02/18/2016 11:30 AM    Calcium 9.5 02/18/2016 11:30 AM    Magnesium 2.5 08/14/2017 11:43 AM    Albumin 3.4 01/25/2016 03:28 PM    Protein, total 7.6 01/25/2016 03:28 PM    Globulin 4.2 01/25/2016 03:28 PM    A-G Ratio 0.8 01/25/2016 03:28 PM    AST (SGOT) 18 01/25/2016 03:28 PM    ALT (SGPT) 23 01/25/2016 03:28 PM     Lab Results   Component Value Date/Time    WBC 5.7 05/11/2014 05:25 AM    HGB 14.7 05/11/2014 05:25 AM    HCT 42.2 05/11/2014 05:25 AM    PLATELET 099 71/42/0690 05:25 AM       RADIOLOGY:      08/23/17: CT Chest     INDICATION:  Follow-up lung cancer     Multiple axial images were obtained through the chest without IV contrast.   Radiation dose reduction techniques were used for this study: All CT scans  performed at this facility use one or all of the following: Automated exposure  control, adjustment of the mA and/or kVp according to patient's size, iterative  reconstruction. The study is compared to 05/16/2017.     FINDINGS:   The nodule in the left upper lobe just lateral to the superior hilum now  measures 10 x 8 mm compared with 11 x 9 mm on the prior exam.  The nodule in the  left lingula is smaller, 8 x 6 mm compared with 9 x 9 mm on the prior study. There are 2 stable 3 mm nodules in the right middle lobe. There is a stable 4  mm nodule in the right lower lobe. No new or enlarging pulmonary masses are  seen. There are no developing infiltrates or effusions.     There is no significant mediastinal or axillary adenopathy. There is moderate  vascular calcification. There are no bony lesions. Limited imaging of the upper  abdomen shows no significant adrenal mass.     IMPRESSION  IMPRESSION:   1. The left lingula mass is smaller. 2.  The left upper lobe mass is relatively stable. 03/23/17: PET/CT       Indication: Initial evaluation right lung pulmonary nodule. History of left  breast cancer with radiation therapy 2009.     Radiopharmaceutical: 17.8 mCi F18-FDG, intravenously.     Technique: Imaging was performed from the skull through the proximal thighs  using routine PET/CT acquisition protocol. Imaging was performed approximately  60 minutes post injection. Oral contrast was administered.  Radiation dose  reduction techniques were used for this study:  Our CT scanners use one or all  of the following: Automated exposure control, adjustment of the mA and/or kVp  according to patient's size, iterative reconstruction.        Serum glucose: 100 mg/dL prior to injection.     Comparison studies: Chest CT 02/18/2016, chest CT 02/22/2017     Findings:     Head and Neck: No lymphadenopathy or abnormal FDG uptake identified.     Chest: The previously described scattered pulmonary nodules are similar in size. The dominant left upper lobe pulmonary nodule is again noted. This lesion has  elevated FDG activity with Max SUV 3.7 image 61. More inferior left upper lobe,  lingula nodule image 82 also with elevated FDG activity in Max SUV 1.9.     Abdomen/Pelvis: No lymphadenopathy or abnormal FDG activity. No bowel  obstruction. Extensive sigmoid diverticulosis. No discrete abnormal osseous  uptake.     IMPRESSION  IMPRESSION:   1. Two dominant left upper lobe pulmonary nodules each with elevated FDG  activity suggesting neoplastic process. 2. No abnormal FDG uptake outside of the chest.        02/22/17: CT CHEST WITHOUT CONTRAST DATED 2/22/2017.     History: Follow-up pulmonary nodule. No current chest complaints.     Comparison: CT chest without contrast 2/18/2016      Technique:   Multiple contiguous helical CT images reconstructed at 5 mm, and  1.25 mm were obtained from the neck base to the mid abdomen without the  administration of contrast. All CT scans performed at this facility use one or  all of the following: Automated exposure control, adjustment of the mA and/or  kVp according to patient's size, iterative reconstruction.     Findings:  CT Chest:  The base of the neck is unremarkable in appearance. No evolving axillary, or  mediastinal lymphadenopathy is seen. Evaluation of the carrie is limited due to  noncontrast technique although no obvious bulky hilar changes are seen. The  thoracic aorta is normal in caliber.     Evaluation with lung windows demonstrates multiple noncalcified pulmonary  nodules. The largest is once again located in the left upper lobe now seen on  image 115. Once again, this appears to have increased in size now measuring 11  mm in greatest transverse dimension (previously measured 9.6 mm).  The increase  in size is more so evident when comparing to more remote comparison studies from  2013. Multiple additional bilateral pulmonary nodules are seen which are not  felt to have significantly changed. No definite new nodule is seen. The central  airways are patent. No pleural effusion is seen. Lungs are expanded without  evidence for pneumothorax.     Limited evaluation of the upper abdomen demonstrates no acute abnormality. The  patient is status post cholecystectomy. A stable cystic lesion is seen in the  upper pole of the right kidney. . The adrenal glands are unremarkable in  appearance.     IMPRESSION  IMPRESSION:    1. Continued enlargement of the largest nodule located in the left upper lobe  which now measures 11 mm in greatest transverse dimension. A neoplasm cannot be  excluded. Given the size, further evaluation should be performed with a PET scan  or biopsy. Initial PET scan confirmation would be recommended given the  patient's age. IMPRESSION:  Alberto Lam is a 80 y.o. female with 2 left lung nodules presumed to be malignant. She is now 2 months s/p CyberKnife SBRT to the 2 lesions. COUNSELING AND COORDINATION OF CARE: I have had a 25 minute follow-up with Ms. Wong Dorothea of which greater than half has been spent counseling her about continued management of her lung nodules. She tolerated  SBRT without difficulty. Her breathing is at baseline. CT scan of the chest shows good initial response to SBRT. We will obtain a CT chest in 3 months. She will return for followup shortly after. Sravanthi Cordero MD   August 29, 2017      Portions of this note were copied from prior encounters and reviewed for accuracy, currency, and represent documentation and tasks completed during this encounter. I verify and attest these portions to be unchanged from prior visits.     Sravanthi Cordero MD  08/29/17

## 2017-08-29 NOTE — PROGRESS NOTES
Pt here for f/u for lung cancer. S/p cyberknife x 5 left lung. RT end 6-23-17. CT chest 8-23-17.     Suni Mistry RN

## 2017-10-09 ENCOUNTER — HOSPITAL ENCOUNTER (OUTPATIENT)
Dept: MAMMOGRAPHY | Age: 82
Discharge: HOME | End: 2017-10-09
Attending: PEDIATRICS
Payer: MEDICARE

## 2017-10-09 DIAGNOSIS — Z12.31 ENCOUNTER FOR SCREENING MAMMOGRAM FOR MALIGNANT NEOPLASM OF BREAST: ICD-10-CM

## 2017-10-09 PROCEDURE — 77067 SCR MAMMO BI INCL CAD: CPT

## 2017-11-17 ENCOUNTER — HOSPITAL ENCOUNTER (OUTPATIENT)
Dept: LAB | Age: 82
Discharge: HOME | End: 2017-11-17
Attending: INTERNAL MEDICINE
Payer: MEDICARE

## 2017-11-17 DIAGNOSIS — I10 HYPERTENSION, UNSPECIFIED TYPE: ICD-10-CM

## 2017-11-17 DIAGNOSIS — E83.42 HYPOMAGNESEMIA: ICD-10-CM

## 2017-11-17 LAB
ANION GAP SERPL CALC-SCNC: 6 MMOL/L
BUN SERPL-MCNC: 22 MG/DL (ref 8–23)
CALCIUM SERPL-MCNC: 9 MG/DL (ref 8.3–10.4)
CHLORIDE SERPL-SCNC: 99 MMOL/L (ref 98–107)
CO2 SERPL-SCNC: 31 MMOL/L (ref 21–32)
CREAT SERPL-MCNC: 0.8 MG/DL (ref 0.6–1)
GLUCOSE SERPL-MCNC: 116 MG/DL (ref 65–100)
MAGNESIUM SERPL-MCNC: 1.9 MG/DL (ref 1.8–2.4)
POTASSIUM SERPL-SCNC: 3.6 MMOL/L (ref 3.5–5.1)
SODIUM SERPL-SCNC: 136 MMOL/L (ref 136–145)

## 2017-11-17 PROCEDURE — 83735 ASSAY OF MAGNESIUM: CPT | Performed by: INTERNAL MEDICINE

## 2017-11-17 PROCEDURE — 36415 COLL VENOUS BLD VENIPUNCTURE: CPT | Performed by: INTERNAL MEDICINE

## 2017-11-17 PROCEDURE — 80048 BASIC METABOLIC PNL TOTAL CA: CPT | Performed by: INTERNAL MEDICINE

## 2017-11-27 ENCOUNTER — HOSPITAL ENCOUNTER (OUTPATIENT)
Dept: CT IMAGING | Age: 82
Discharge: HOME | End: 2017-11-27
Attending: RADIOLOGY
Payer: MEDICARE

## 2017-11-27 DIAGNOSIS — C34.12 MALIGNANT NEOPLASM OF UPPER LOBE OF LEFT LUNG (HCC): ICD-10-CM

## 2017-11-27 PROCEDURE — 71250 CT THORAX DX C-: CPT

## 2017-12-05 ENCOUNTER — HOSPITAL ENCOUNTER (OUTPATIENT)
Dept: RADIATION ONCOLOGY | Age: 82
Discharge: HOME | End: 2017-12-05
Payer: MEDICARE

## 2017-12-05 VITALS
HEART RATE: 67 BPM | TEMPERATURE: 97.7 F | BODY MASS INDEX: 27.42 KG/M2 | RESPIRATION RATE: 18 BRPM | OXYGEN SATURATION: 96 % | DIASTOLIC BLOOD PRESSURE: 68 MMHG | SYSTOLIC BLOOD PRESSURE: 141 MMHG | WEIGHT: 140.4 LBS

## 2017-12-05 DIAGNOSIS — C34.92 MALIGNANT NEOPLASM OF LEFT LUNG, UNSPECIFIED PART OF LUNG (HCC): Primary | ICD-10-CM

## 2017-12-05 PROCEDURE — 99211 OFF/OP EST MAY X REQ PHY/QHP: CPT
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
## 2017-12-05 NOTE — PROGRESS NOTES
F/u lung cancer. S/p cyberknife left lung. RT end 6-23-17.   CT chest without contrast 11-27-17    Isi Meyer RN

## 2018-02-26 ENCOUNTER — HOSPITAL ENCOUNTER (OUTPATIENT)
Dept: LAB | Age: 83
Discharge: HOME | End: 2018-02-26
Payer: MEDICARE

## 2018-02-26 DIAGNOSIS — Z79.899 ON AMIODARONE THERAPY: ICD-10-CM

## 2018-02-26 DIAGNOSIS — I47.1 SUPRAVENTRICULAR TACHYCARDIA (HCC): ICD-10-CM

## 2018-02-26 LAB
ALBUMIN SERPL-MCNC: 3.8 G/DL (ref 3.2–4.6)
ALBUMIN/GLOB SERPL: 0.9 {RATIO}
ALP SERPL-CCNC: 88 U/L (ref 50–136)
ALT SERPL-CCNC: 23 U/L (ref 12–65)
ANION GAP SERPL CALC-SCNC: 7 MMOL/L
AST SERPL-CCNC: 17 U/L (ref 15–37)
BILIRUB SERPL-MCNC: 0.5 MG/DL (ref 0.2–1.1)
BUN SERPL-MCNC: 16 MG/DL (ref 8–23)
CALCIUM SERPL-MCNC: 9.2 MG/DL (ref 8.3–10.4)
CHLORIDE SERPL-SCNC: 99 MMOL/L (ref 98–107)
CO2 SERPL-SCNC: 31 MMOL/L (ref 21–32)
CREAT SERPL-MCNC: 0.9 MG/DL (ref 0.6–1)
GLOBULIN SER CALC-MCNC: 4.3 G/DL
GLUCOSE SERPL-MCNC: 106 MG/DL (ref 65–100)
POTASSIUM SERPL-SCNC: 3.7 MMOL/L (ref 3.5–5.1)
PROT SERPL-MCNC: 8.1 G/DL (ref 6.3–8.2)
SODIUM SERPL-SCNC: 137 MMOL/L (ref 136–145)
T4 FREE SERPL-MCNC: 2.2 NG/DL (ref 0.78–1.46)
TSH SERPL DL<=0.005 MIU/L-ACNC: 0.11 UIU/ML (ref 0.36–3.74)

## 2018-02-26 PROCEDURE — 84443 ASSAY THYROID STIM HORMONE: CPT | Performed by: INTERNAL MEDICINE

## 2018-02-26 PROCEDURE — 36415 COLL VENOUS BLD VENIPUNCTURE: CPT | Performed by: INTERNAL MEDICINE

## 2018-02-26 PROCEDURE — 84439 ASSAY OF FREE THYROXINE: CPT | Performed by: INTERNAL MEDICINE

## 2018-02-26 PROCEDURE — 80053 COMPREHEN METABOLIC PANEL: CPT | Performed by: INTERNAL MEDICINE

## 2018-02-27 NOTE — PROGRESS NOTES
Please call patient. Thyroid level elevated. Decrease synthroid to 100 mcg a day and recheck TSH and T4 in 6 weeks.    Thank you

## 2018-02-27 NOTE — PROGRESS NOTES
Daughter informed of changes. She verbalized understanding. RX sent to [facility] as requested.

## 2018-03-01 ENCOUNTER — HOSPITAL ENCOUNTER (OUTPATIENT)
Dept: CT IMAGING | Age: 83
Discharge: HOME | End: 2018-03-01
Attending: RADIOLOGY
Payer: MEDICARE

## 2018-03-01 DIAGNOSIS — C34.92 MALIGNANT NEOPLASM OF LEFT LUNG, UNSPECIFIED PART OF LUNG (HCC): ICD-10-CM

## 2018-03-01 PROCEDURE — 71250 CT THORAX DX C-: CPT

## 2018-03-06 ENCOUNTER — HOSPITAL ENCOUNTER (OUTPATIENT)
Dept: RADIATION ONCOLOGY | Age: 83
End: 2018-03-06

## 2018-03-06 ENCOUNTER — HOSPITAL ENCOUNTER (OUTPATIENT)
Dept: RADIATION ONCOLOGY | Age: 83
Discharge: HOME | End: 2018-03-06
Payer: MEDICARE

## 2018-03-06 VITALS
TEMPERATURE: 97.6 F | SYSTOLIC BLOOD PRESSURE: 137 MMHG | OXYGEN SATURATION: 95 % | BODY MASS INDEX: 27.48 KG/M2 | HEART RATE: 60 BPM | WEIGHT: 140 LBS | RESPIRATION RATE: 16 BRPM | HEIGHT: 60 IN | DIASTOLIC BLOOD PRESSURE: 69 MMHG

## 2018-03-06 DIAGNOSIS — C34.90 MALIGNANT NEOPLASM OF LUNG, UNSPECIFIED LATERALITY, UNSPECIFIED PART OF LUNG (HCC): Primary | ICD-10-CM

## 2018-03-06 PROCEDURE — 99211 OFF/OP EST MAY X REQ PHY/QHP: CPT

## 2018-03-06 NOTE — PROGRESS NOTES
Patient: Mary Henry MRN: [id number]  SSN: [id number]    YOB: 1925  Age: 80 y.o. Sex: female      Other Providers:  Monica Brito MD    CHIEF COMPLAINT: Lung nodules    DIAGNOSIS: Pulmonary nodules presumed to be malignant    SITE TREATED AND DOSE DELIVERED: The left upper lobe nodule and lingular nodule each received 5000 cGy in 5 fractions of 1000 cGy each using CyberKnife SBRT. TREATMENT DATES: 06/12/17 through 06/23/17    HISTORY OF PRESENT ILLNESS:  Mary Henry is a 80 y.o. female initially seen by Dr. Poncho Lopez at the request of Dr. Koby Wood. The patient has a history of thyroid and breast cancers. She has a long history of lung nodules that have been followed for at least the past 4 years by Dr. Alex Arreola. She has undergone serial CT scanning and has been found to have a left upper lobe nodule and a lingular nodule. She has not had any pulmonary complaints. The lung nodules have been essentially stable dating back to 2013. However, chest CT on 02/18/16 showed slight increase compared to 2013. Chest CT on 02/22/17 showed continued enlargement of the largest nodule located in the left upper lobe now measuring 11 mm compared to 9.6 mm on prior scan. Multiple additional bilateral pulmonary nodules were seen, but they were felt not to have changed significantly. PET/CT scan on 03/23/17 showed the dominant left upper pole lobe pulmonary nodule to have elevated FDG activity with a maximum SUV of 3.7. The left lingular nodule also demonstrated mildly elevated FDG activity with maximum SUV of 1.9. These were felt to be suggestive of a neoplastic process. No abnormal FDG uptake was seen outside the chest.  On 05/16/17 she underwent navigational bronchoscopy with biopsy of the posterior segment left upper lobe nodule that was nondiagnostic. The lingular nodule was not biopsied due to its proximity to the heart.   Fiducial markers were placed around both nodules in anticipation of possible SBRT with CyberKnife. Her case was presented at multidisciplinary thoracic tumor board and recommendation was for consideration of SBRT. She went on to receive CyberKnife SBRT delivering 5000 cGy in 5 fractions of 1000 cGy each from 06/12/17 through 06/23/17. She tolerated treatment without significant difficulty. INTERVAL HISTORY: Ms. Wright Seen returns for follow-up 6 months after completion of CyberKnife SBRT to 2 left lung lesions. She tolerated treatment without acute side effects. CT scan of the chest on 08/23/17 showed the nodule in the left upper lobe just lateral to the superior hilum to now measure 10 x 8 mm compared with 11 x 9 mm on prior exam.  The nodule in the lingula was smaller, now measuring 8 x 6 mm compared to 9 x 9 mm on prior exam.  2 stable 3 mm nodules were again seen in the right middle lobe. A stable 4 mm nodule seen in the right lower lobe. No new or enlarging pulmonary masses were seen. CT scan of the chest on 11/27/17 showed significant interval increase in infiltrative density adjacent to previously small right upper lobe and lingular nodules. This is unclear is progressive disease vs post radiation changes. At this time, Ms. Wright Seen is doing very well. She denies change in her respiratory status. She is at her baseline with regard to pulmonary function. Her energy/stamina is back to her baseline. PAST MEDICAL HISTORY:    Past Medical History:   Diagnosis Date    Arrhythmia 7/27/2016    Arthritis     Benign hypertensive heart disease without CHF 5/24/2016    Cancer [facility])     History of thyroid cancer and breast cancer.  Cardiac Arrhythmia, SVT 5/24/2016    History of breast cancer 5/9/2014    Hx of thyroid cancer 5/9/2014    S/p thyroidectomy     Hypertension     Hypo-osmolality and hyponatremia 5/24/2016    Hypomagnesemia 5/24/2016    Hypothyroidism 5/24/2016    Pulmonary nodule, left 5/15/2013    Left lower lobe spheres segment.  A 7 mm on [facility] scan December 2012. 1.8 S UV on PET scan. 6 mm on follow up CT May 2013.  Radiation therapy complication        PAST SURGICAL HISTORY:   Past Surgical History:   Procedure Laterality Date    HX BREAST BIOPSY      HX BREAST LUMPECTOMY      HX CHOLECYSTECTOMY      HX THYROIDECTOMY      X2 for cancer     MEDICATIONS:     Current Outpatient Prescriptions:     levothyroxine (SYNTHROID) 100 mcg tablet, Take 1 Tab by mouth Daily (before breakfast). (Patient taking differently: Take 112 mcg by mouth Daily (before breakfast). Patient is taking Levothyroxine 112mcg 1 TAB PO QAM), Disp: 30 Tab, Rfl: 5    MAGNESIUM PO, Take 160 mg by mouth daily. , Disp: , Rfl:     amiodarone (PACERONE) 100 mg tablet, Take 1 Tab by mouth daily. , Disp: 90 Tab, Rfl: 3    potassium chloride SR (KLOR-CON 10) 10 mEq tablet, Take 2 tabs po bid every day, Disp: 360 Tab, Rfl: 3    dextromethorphan-guaiFENesin (ROBITUSSIN-DM)  mg/5 mL syrup, Take 10 mL by mouth every four (4) hours as needed for Cough. , Disp: , Rfl:     indapamide (LOZOL) 2.5 mg tablet, Take 2.5 mg by mouth two (2) times a day., Disp: , Rfl:     amLODIPine (NORVASC) 5 mg tablet, Take 5 mg by mouth daily. , Disp: , Rfl:     LUTEIN PO, Take 10 mg by mouth daily. 1 tab po qd, Disp: , Rfl:     CALCIUM CARBONATE/VITAMIN D3 (CALCIUM + D PO), Take  by mouth. 800 mg Calcium/2000 units vitamin D daily  Indications: with magnesium, Disp: , Rfl:     multivitamin (ONE A DAY) tablet, Take 1 Tab by mouth daily. , Disp: , Rfl:     ALLERGIES:   No Known Allergies    SOCIAL HISTORY:   Social History     Social History    Marital status:      Spouse name: N/A    Number of children: N/A    Years of education: N/A     Occupational History    Not on file.      Social History Main Topics    Smoking status: Never Smoker    Smokeless tobacco: Never Used    Alcohol use No    Drug use: No    Sexual activity: No     Other Topics Concern    Not on file     Social History Narrative     FAMILY HISTORY:   No family history on file. REVIEW OF SYSTEMS: Please see the completed review of systems sheet in the chart that I have reviewed today. PHYSICAL EXAMINATION:   ECOG Performance status 1  VITAL SIGNS:   Visit Vitals    /69 (BP 1 Location: Left arm, BP Patient Position: Sitting)    Pulse 60    Temp 97.6 °F (36.4 °C)    Resp 16    Ht 5' (1.524 m)    Wt 140 lb (63.5 kg)    SpO2 95%    BMI 27.34 kg/m2        GENERAL: The patient is well-developed, ambulatory, alert and in no acute distress. RESPIRATORY: Lungs are clear to auscultation. There is normal respiratory effort. PATHOLOGY:    As detailed in HPI. LABORATORY:   Lab Results   Component Value Date/Time    Sodium 137 02/26/2018 11:00 AM    Potassium 3.7 02/26/2018 11:00 AM    Chloride 99 02/26/2018 11:00 AM    CO2 31 02/26/2018 11:00 AM    Anion gap 7 02/26/2018 11:00 AM    Glucose 106 (H) 02/26/2018 11:00 AM    BUN 16 02/26/2018 11:00 AM    Creatinine 0.90 02/26/2018 11:00 AM    GFR est AA >60 02/26/2018 11:00 AM    GFR est non-AA >60 02/26/2018 11:00 AM    Calcium 9.2 02/26/2018 11:00 AM    Magnesium 1.9 11/17/2017 01:25 PM    Albumin 3.8 02/26/2018 11:00 AM    Protein, total 8.1 02/26/2018 11:00 AM    Globulin 4.3 02/26/2018 11:00 AM    A-G Ratio 0.9 02/26/2018 11:00 AM    AST (SGOT) 17 02/26/2018 11:00 AM    ALT (SGPT) 23 02/26/2018 11:00 AM     Lab Results   Component Value Date/Time    WBC 5.7 05/11/2014 05:25 AM    HGB 14.7 05/11/2014 05:25 AM    HCT 42.2 05/11/2014 05:25 AM    PLATELET 618 44/74/6401 05:25 AM       RADIOLOGY:    NONCONTRAST CHEST CT  11/27/17     CLINICAL HISTORY:  Restaging lung cancer.     COMPARISON:  May 23, 2017.     FINDINGS:  At the level of the previously 1.0 cm left upper lobe nodule just  posterior to the most medial surgical clip, there is now infiltrative density  with air bronchograms extending over approximately 5.2 cm AP x 2.8 cm  transverse.   At the level of the previously 8 mm lingular nodule just lateral to  the most inferior surgical clips, there is now infiltrative density measuring  approximately 3.9 cm x 4.6 cm.  2 posterior right middle lobe nodules each  measuring 3 mm remain stable, although a right lower lobe nodule is slightly  larger at 5 mm today compared with 4 mm in August.  A 3 mm nodule anteriorly in  the left upper lobe on image 18 is stable. No discrete new pulmonary nodule is  seen in either lung. No pathologically enlarged lymph nodes or abnormal fluid  collection is seen. Exophytic right upper pole renal cyst is again noted. The  epigastrium otherwise appears unremarkable as imaged. Bone windows demonstrate  no definite aggressive lesion accounting for extensive degenerative changes  associated with dextroconvex thoracolumbar scoliosis.     IMPRESSION:       1. Significant interval increase in infiltrative density adjacent to previously  small right upper lobe and lingular nodules since August 23 could represent  radiation changes or less likely postobstructive pneumonia, but progressive  tumor must be considered in the absence of interval therapy.      3. Interval stability of multiple smaller pulmonary nodules elsewhere  bilaterally. 08/23/17: CT Chest     INDICATION:  Follow-up lung cancer     Multiple axial images were obtained through the chest without IV contrast.   Radiation dose reduction techniques were used for this study: All CT scans  performed at this facility use one or all of the following: Automated exposure  control, adjustment of the mA and/or kVp according to patient's size, iterative  reconstruction. The study is compared to 05/16/2017.     FINDINGS:   The nodule in the left upper lobe just lateral to the superior hilum now  measures 10 x 8 mm compared with 11 x 9 mm on the prior exam.  The nodule in the  left lingula is smaller, 8 x 6 mm compared with 9 x 9 mm on the prior study.    There are 2 stable 3 mm nodules in the right middle lobe. There is a stable 4  mm nodule in the right lower lobe. No new or enlarging pulmonary masses are  seen. There are no developing infiltrates or effusions.     There is no significant mediastinal or axillary adenopathy. There is moderate  vascular calcification. There are no bony lesions. Limited imaging of the upper  abdomen shows no significant adrenal mass.     IMPRESSION:   1. The left lingula mass is smaller. 2.  The left upper lobe mass is relatively stable. 03/23/17: PET/CT       Indication: Initial evaluation right lung pulmonary nodule. History of left  breast cancer with radiation therapy 2009.     Radiopharmaceutical: 17.8 mCi F18-FDG, intravenously.     Serum glucose: 100 mg/dL prior to injection.     Comparison studies: Chest CT 02/18/2016, chest CT 02/22/2017     Findings:     Head and Neck: No lymphadenopathy or abnormal FDG uptake identified.     Chest: The previously described scattered pulmonary nodules are similar in size. The dominant left upper lobe pulmonary nodule is again noted. This lesion has  elevated FDG activity with Max SUV 3.7 image 61. More inferior left upper lobe,  lingula nodule image 82 also with elevated FDG activity in Max SUV 1.9.     Abdomen/Pelvis: No lymphadenopathy or abnormal FDG activity. No bowel  obstruction. Extensive sigmoid diverticulosis. No discrete abnormal osseous  uptake.     IMPRESSION:   1. Two dominant left upper lobe pulmonary nodules each with elevated FDG  activity suggesting neoplastic process. 2. No abnormal FDG uptake outside of the chest.    02/22/17: CT CHEST WITHOUT CONTRAST DATED 2/22/2017.     History: Follow-up pulmonary nodule. No current chest complaints.     Comparison: CT chest without contrast 2/18/2016     Findings:  CT Chest:  The base of the neck is unremarkable in appearance. No evolving axillary, or  mediastinal lymphadenopathy is seen.   Evaluation of the carrie is limited due to  noncontrast technique although no obvious bulky hilar changes are seen. The  thoracic aorta is normal in caliber.     Evaluation with lung windows demonstrates multiple noncalcified pulmonary  nodules. The largest is once again located in the left upper lobe now seen on  image 115. Once again, this appears to have increased in size now measuring 11  mm in greatest transverse dimension (previously measured 9.6 mm). The increase  in size is more so evident when comparing to more remote comparison studies from  2013. Multiple additional bilateral pulmonary nodules are seen which are not  felt to have significantly changed. No definite new nodule is seen. The central  airways are patent. No pleural effusion is seen. Lungs are expanded without  evidence for pneumothorax.     Limited evaluation of the upper abdomen demonstrates no acute abnormality. The  patient is status post cholecystectomy. A stable cystic lesion is seen in the  upper pole of the right kidney. . The adrenal glands are unremarkable in  appearance.     IMPRESSION:    Continued enlargement of the largest nodule located in the left upper lobe  which now measures 11 mm in greatest transverse dimension. A neoplasm cannot be  excluded. Given the size, further evaluation should be performed with a PET scan  or biopsy. Initial PET scan confirmation would be recommended given the  patient's age. CT THORAX WITHOUT CONTRAST     HISTORY: Lung cancer, unspecified; surveillance. , 80 years Female restaging     COMPARISON: CT chest November 27, 2017 dating back to May 15, 2013     FINDINGS:  Evidence of thyroidectomy. No evidence of significant mediastinal, or axillary  lymphadenopathy. Severe calcific atherosclerosis of a normal caliber aortic  arch and descending aorta. Mild biapical scarring unchanged.   There has been  interval decrease in size of the soft tissue mass at the left hilum, now  measuring approximately 3.0 x 3.0 cm, with containing multiple fiducial seed  markers, likely representing treatment response. The lingular mass which  appears somewhat spiculated and linear measuring 1.1 x 2.2 cm is also decrease  in size, with fiducial seed markers. Mild dependent subsegmental atelectasis  bilateral lung bases. No evidence of pleural effusion. Scattered small  right-sided peripheral pulmonary nodules measuring to 5 mm in the right lower  lobe and 4 mm in the left base appear unchanged, consistent with pulmonary  metastatic disease. No evidence of new pulmonary nodule. Visualized proximal  airways unremarkable.       Evidence of cholecystectomy. Large simple fluid density right renal upper pole  cortical cyst is unchanged. An intrinsically hyperdense left renal interpolar  lateral cortical lesion also unchanged, not fully evaluated here but unchanged  since 2013, probably benign. Small indeterminate left adrenal nodule is  unchanged since 2013, measuring 7 mm in diameter, most likely benign. Visualized upper abdominal viscera including the adrenal glands are otherwise  unremarkable. Chronic healed fracture deformity of the right anterolateral  sixth rib unchanged. Visualized osseous structures otherwise unremarkable.     IMPRESSION:      1. Findings consistent with interval treatment response with decreased size of      the spiculated masses in the left upper lobe. 2.  Scattered bilateral pulmonary metastatic nodules otherwise unchanged. 3.  Other chronic findings as above.        IMPRESSION:  Sonia Barraza is a 80 y.o. female with 2 left lung nodules presumed to be malignant. She is now 8 months s/p CyberKnife SBRT to the 2 lesions. She tolerated SBRT with out incident. CT scan of the chest, 03/01/18 shows treatment response. She continues to do well with no complaints. COUNSELING AND COORDINATION OF CARE: I have had a 25 minute visit with ms Gonzalez of which greater than half has been spent counseling her about continued management of her lung nodules.  She tolerated  SBRT without difficulty. We will obtain a repeat CT chest in 6 months. I will see her shortly after. Yenni Avina NP   March 6, 2018      Portions of this note were copied from prior encounters and reviewed for accuracy, currency, and represent documentation and tasks completed during this encounter. I verify and attest these portions to be unchanged from prior visits.

## 2018-03-06 NOTE — PROGRESS NOTES
Pt here for FUP post SBRT to the left lung which ended 6/23/17. The 3/1/18 CT Chest indicated a treatment response. Pt had no c/o today.

## 2018-04-16 ENCOUNTER — HOSPITAL ENCOUNTER (OUTPATIENT)
Dept: LAB | Age: 83
Discharge: HOME | End: 2018-04-16
Payer: MEDICARE

## 2018-04-16 DIAGNOSIS — I10 HYPERTENSION, UNSPECIFIED TYPE: ICD-10-CM

## 2018-04-16 LAB
T4 FREE SERPL-MCNC: 1.5 NG/DL (ref 0.78–1.46)
TSH SERPL DL<=0.005 MIU/L-ACNC: 2.66 UIU/ML (ref 0.36–3.74)

## 2018-04-16 PROCEDURE — 84443 ASSAY THYROID STIM HORMONE: CPT | Performed by: INTERNAL MEDICINE

## 2018-04-16 PROCEDURE — 84439 ASSAY OF FREE THYROXINE: CPT | Performed by: INTERNAL MEDICINE

## 2018-04-16 PROCEDURE — 36415 COLL VENOUS BLD VENIPUNCTURE: CPT | Performed by: INTERNAL MEDICINE

## 2018-08-27 ENCOUNTER — HOSPITAL ENCOUNTER (OUTPATIENT)
Dept: CT IMAGING | Age: 83
Discharge: HOME | End: 2018-08-27
Attending: NURSE PRACTITIONER
Payer: MEDICARE

## 2018-08-27 DIAGNOSIS — C34.90 MALIGNANT NEOPLASM OF LUNG, UNSPECIFIED LATERALITY, UNSPECIFIED PART OF LUNG (HCC): ICD-10-CM

## 2018-08-27 LAB — CREAT BLD-MCNC: 0.8 MG/DL (ref 0.8–1.5)

## 2018-08-27 PROCEDURE — 71260 CT THORAX DX C+: CPT

## 2018-08-27 PROCEDURE — 82565 ASSAY OF CREATININE: CPT

## 2018-08-27 PROCEDURE — 74011636320 HC RX REV CODE- 636/320: Performed by: NURSE PRACTITIONER

## 2018-08-27 PROCEDURE — 74011000258 HC RX REV CODE- 258: Performed by: NURSE PRACTITIONER

## 2018-08-27 RX ORDER — SODIUM CHLORIDE 0.9 % (FLUSH) 0.9 %
10 SYRINGE (ML) INJECTION
Status: COMPLETED | OUTPATIENT
Start: 2018-08-27 | End: 2018-08-27

## 2018-08-27 RX ADMIN — SODIUM CHLORIDE 100 ML: 900 INJECTION, SOLUTION INTRAVENOUS at 09:50

## 2018-08-27 RX ADMIN — IOPAMIDOL 80 ML: 755 INJECTION, SOLUTION INTRAVENOUS at 09:50

## 2018-08-27 RX ADMIN — Medication 10 ML: at 09:50

## 2018-08-29 PROBLEM — C34.90 LUNG CANCER (HCC): Status: ACTIVE | Noted: 2018-08-29

## 2018-09-04 ENCOUNTER — HOSPITAL ENCOUNTER (OUTPATIENT)
Dept: RADIATION ONCOLOGY | Age: 83
Discharge: HOME | End: 2018-09-04
Payer: MEDICARE

## 2018-09-04 VITALS
WEIGHT: 137.6 LBS | TEMPERATURE: 97.5 F | OXYGEN SATURATION: 96 % | HEART RATE: 64 BPM | RESPIRATION RATE: 16 BRPM | HEIGHT: 59 IN | BODY MASS INDEX: 27.74 KG/M2 | SYSTOLIC BLOOD PRESSURE: 145 MMHG | DIASTOLIC BLOOD PRESSURE: 64 MMHG

## 2018-09-04 DIAGNOSIS — C34.12 MALIGNANT NEOPLASM OF UPPER LOBE OF LEFT LUNG (HCC): Primary | ICD-10-CM

## 2018-09-04 PROCEDURE — 99211 OFF/OP EST MAY X REQ PHY/QHP: CPT

## 2018-09-04 NOTE — PROGRESS NOTES
Patient: Mae Garrison MRN: [id number]  SSN: [id number] YOB: 1925  Age: 80 y.o. Sex: female Other Providers:  Riya Malloy MD 
 
CHIEF COMPLAINT: Lung nodules DIAGNOSIS: Pulmonary nodules presumed to be malignant SITE TREATED AND DOSE DELIVERED: The left upper lobe nodule and lingular nodule each received 5000 cGy in 5 fractions of 1000 cGy each using CyberKnife SBRT. TREATMENT DATES: 06/12/17 through 06/23/17 HISTORY OF PRESENT ILLNESS:  Mae Garrison is a 80 y.o. female initially seen by Dr. Austin Hastings at the request of Dr. Sonia Ma. The patient has a history of thyroid and breast cancers. She has a long history of lung nodules that have been followed for at least the past 4 years by Dr. Florence Murphy. She has undergone serial CT scanning and has been found to have a left upper lobe nodule and a lingular nodule. She has not had any pulmonary complaints. The lung nodules have been essentially stable dating back to 2013. However, chest CT on 02/18/16 showed slight increase compared to 2013. Chest CT on 02/22/17 showed continued enlargement of the largest nodule located in the left upper lobe now measuring 11 mm compared to 9.6 mm on prior scan. Multiple additional bilateral pulmonary nodules were seen, but they were felt not to have changed significantly. PET/CT scan on 03/23/17 showed the dominant left upper pole lobe pulmonary nodule to have elevated FDG activity with a maximum SUV of 3.7. The left lingular nodule also demonstrated mildly elevated FDG activity with maximum SUV of 1.9. These were felt to be suggestive of a neoplastic process. No abnormal FDG uptake was seen outside the chest.  On 05/16/17 she underwent navigational bronchoscopy with biopsy of the posterior segment left upper lobe nodule that was nondiagnostic. The lingular nodule was not biopsied due to its proximity to the heart.   Fiducial markers were placed around both nodules in anticipation of possible SBRT with CyberKnife. Her case was presented at multidisciplinary thoracic tumor board and recommendation was for consideration of SBRT. She went on to receive CyberKnife SBRT delivering 5000 cGy in 5 fractions of 1000 cGy each from 06/12/17 through 06/23/17. She tolerated treatment without significant difficulty. INTERVAL HISTORY: Ms. Ricci Bond returns for follow-up 15 months after completion of CyberKnife SBRT to 2 left lung lesions. She tolerated treatment without acute side effects. CT scan of the chest on 08/23/17 showed the nodule in the left upper lobe just lateral to the superior hilum to now measure 10 x 8 mm compared with 11 x 9 mm on prior exam.  The nodule in the lingula was smaller, now measuring 8 x 6 mm compared to 9 x 9 mm on prior exam.  2 stable 3 mm nodules were again seen in the right middle lobe. A stable 4 mm nodule seen in the right lower lobe. No new or enlarging pulmonary masses were seen. CT scan of the chest on 11/27/17 showed significant interval increase in infiltrative density adjacent to previously small right upper lobe and lingular nodules. This is unclear is progressive disease vs post radiation changes. CT scan of the chest on 3/34141 showed findings to be consistent with interval treatment response with decreased size of the spiculated masses in the left upper lobe. Scattered bilateral pulmonary metastatic nodules otherwise unchanged. CT scan of the chest on 8/27/2018 showed resolving postradiation pneumonitis with no new abnormalities. At this time, Ms. Ricci Bond continues to do very well. She denies change in her respiratory status. She is at her baseline with regard to pulmonary function. Her energy/stamina is at her baseline. She has no complaints. PAST MEDICAL HISTORY:   
Past Medical History:  
Diagnosis Date  Arrhythmia 7/27/2016  Arthritis  Benign hypertensive heart disease without CHF 5/24/2016  Cancer ([facility] [address] History of thyroid cancer and breast cancer.  Cardiac Arrhythmia, SVT 5/24/2016  History of breast cancer 5/9/2014  Hx of thyroid cancer 5/9/2014 S/p thyroidectomy  Hypertension  Hypo-osmolality and hyponatremia 5/24/2016  Hypomagnesemia 5/24/2016  Hypothyroidism 5/24/2016  Pulmonary nodule, left 5/15/2013 Left lower lobe spheres segment. A 7 mm on [facility] scan December 2012. 1.8 S UV on PET scan. 6 mm on follow up CT May 2013.  Radiation therapy complication PAST SURGICAL HISTORY:  
Past Surgical History:  
Procedure Laterality Date  HX BREAST BIOPSY  HX BREAST LUMPECTOMY  HX CHOLECYSTECTOMY  HX THYROIDECTOMY X2 for cancer MEDICATIONS:  
 
Current Outpatient Prescriptions:  
  levothyroxine (SYNTHROID) 112 mcg tablet, Take  by mouth Daily (before breakfast). , Disp: , Rfl:  
  KLOR-CON 10 10 mEq tablet, TAKE 2 TABLETS TWICE A DAY, Disp: 360 Tab, Rfl: 3 
  amiodarone (PACERONE) 100 mg tablet, Take 1 Tab by mouth daily. , Disp: 90 Tab, Rfl: 3 
  MAGNESIUM PO, Take 160 mg by mouth daily. , Disp: , Rfl:  
  dextromethorphan-guaiFENesin (ROBITUSSIN-DM)  mg/5 mL syrup, Take 10 mL by mouth every four (4) hours as needed for Cough. , Disp: , Rfl:  
  indapamide (LOZOL) 2.5 mg tablet, Take 2.5 mg by mouth two (2) times a day., Disp: , Rfl:  
  amLODIPine (NORVASC) 5 mg tablet, Take 5 mg by mouth daily. , Disp: , Rfl:  
  LUTEIN PO, Take 10 mg by mouth daily. 1 tab po qd, Disp: , Rfl:  
  CALCIUM CARBONATE/VITAMIN D3 (CALCIUM + D PO), Take  by mouth. 800 mg Calcium/2000 units vitamin D daily  Indications: with magnesium, Disp: , Rfl:  
  multivitamin (ONE A DAY) tablet, Take 1 Tab by mouth daily. , Disp: , Rfl: ALLERGIES:  
No Known Allergies SOCIAL HISTORY:  
Social History Social History  Marital status:    Spouse name: N/A  
  Number of children: N/A  
 Years of education: N/A Occupational History  Not on file. Social History Main Topics  Smoking status: Never Smoker  Smokeless tobacco: Never Used  Alcohol use No  
 Drug use: No  
 Sexual activity: No  
 
Other Topics Concern  Not on file Social History Narrative FAMILY HISTORY:  
No family history on file. REVIEW OF SYSTEMS: Please see the completed review of systems sheet in the chart that I have reviewed today. PHYSICAL EXAMINATION:  
ECOG Performance status 1 
VITAL SIGNS:  Blood pressure 145/64  Pulse 64  Temperature 97.5  Weight 137.6 GENERAL: The patient is well-developed, ambulatory, alert and in no acute distress. RESPIRATORY: Lungs are clear to auscultation. There is normal respiratory effort. PATHOLOGY:   
As detailed in HPI. LABORATORY:  
Lab Results Component Value Date/Time Sodium 137 02/26/2018 11:00 AM  
 Potassium 3.7 02/26/2018 11:00 AM  
 Chloride 99 02/26/2018 11:00 AM  
 CO2 31 02/26/2018 11:00 AM  
 Anion gap 7 02/26/2018 11:00 AM  
 Glucose 106 (H) 02/26/2018 11:00 AM  
 BUN 16 02/26/2018 11:00 AM  
 Creatinine 0.90 02/26/2018 11:00 AM  
 GFR est AA >60 02/26/2018 11:00 AM  
 GFR est non-AA >60 02/26/2018 11:00 AM  
 Calcium 9.2 02/26/2018 11:00 AM  
 Magnesium 1.9 11/17/2017 01:25 PM  
 Albumin 3.8 02/26/2018 11:00 AM  
 Protein, total 8.1 02/26/2018 11:00 AM  
 Globulin 4.3 02/26/2018 11:00 AM  
 A-G Ratio 0.9 02/26/2018 11:00 AM  
 AST (SGOT) 17 02/26/2018 11:00 AM  
 ALT (SGPT) 23 02/26/2018 11:00 AM  
 
Lab Results Component Value Date/Time  WBC 5.7 05/11/2014 05:25 AM  
 HGB 14.7 05/11/2014 05:25 AM  
 HCT 42.2 05/11/2014 05:25 AM  
 PLATELET 216 34/34/9581 05:25 AM  
 
 
RADIOLOGY:   
NONCONTRAST CHEST CT  11/27/17 
  
CLINICAL HISTORY:  Restaging lung cancer. 
  
COMPARISON:  May 23, 2017. 
  
FINDINGS:  At the level of the previously 1.0 cm left upper lobe nodule just 
 posterior to the most medial surgical clip, there is now infiltrative density 
with air bronchograms extending over approximately 5.2 cm AP x 2.8 cm 
transverse. At the level of the previously 8 mm lingular nodule just lateral to 
the most inferior surgical clips, there is now infiltrative density measuring 
approximately 3.9 cm x 4.6 cm.  2 posterior right middle lobe nodules each 
measuring 3 mm remain stable, although a right lower lobe nodule is slightly 
larger at 5 mm today compared with 4 mm in August.  A 3 mm nodule anteriorly in 
the left upper lobe on image 18 is stable. No discrete new pulmonary nodule is 
seen in either lung. No pathologically enlarged lymph nodes or abnormal fluid 
collection is seen. Exophytic right upper pole renal cyst is again noted. The 
epigastrium otherwise appears unremarkable as imaged. Bone windows demonstrate 
no definite aggressive lesion accounting for extensive degenerative changes 
associated with dextroconvex thoracolumbar scoliosis. 
  
IMPRESSION:   
  
1. Significant interval increase in infiltrative density adjacent to previously 
small right upper lobe and lingular nodules since August 23 could represent 
radiation changes or less likely postobstructive pneumonia, but progressive 
tumor must be considered in the absence of interval therapy.  
  
3. Interval stability of multiple smaller pulmonary nodules elsewhere 
bilaterally. 08/23/17: CT Chest 
  
INDICATION:  Follow-up lung cancer 
  
Multiple axial images were obtained through the chest without IV contrast.  
Radiation dose reduction techniques were used for this study: All CT scans 
performed at this facility use one or all of the following: Automated exposure 
control, adjustment of the mA and/or kVp according to patient's size, iterative 
reconstruction. The study is compared to 05/16/2017. 
  
FINDINGS:  
The nodule in the left upper lobe just lateral to the superior hilum now measures 10 x 8 mm compared with 11 x 9 mm on the prior exam.  The nodule in the 
left lingula is smaller, 8 x 6 mm compared with 9 x 9 mm on the prior study. There are 2 stable 3 mm nodules in the right middle lobe. There is a stable 4 
mm nodule in the right lower lobe. No new or enlarging pulmonary masses are 
seen. There are no developing infiltrates or effusions. 
  
There is no significant mediastinal or axillary adenopathy. There is moderate 
vascular calcification. There are no bony lesions. Limited imaging of the upper 
abdomen shows no significant adrenal mass. 
  
IMPRESSION:  
1. The left lingula mass is smaller. 2.  The left upper lobe mass is relatively stable. 03/23/17: PET/CT   
  
Indication: Initial evaluation right lung pulmonary nodule. History of left 
breast cancer with radiation therapy 2009. 
  
Radiopharmaceutical: 17.8 mCi F18-FDG, intravenously. 
  
Serum glucose: 100 mg/dL prior to injection. 
  
Comparison studies: Chest CT 02/18/2016, chest CT 02/22/2017 
  
Findings: 
  
Head and Neck: No lymphadenopathy or abnormal FDG uptake identified. 
  
Chest: The previously described scattered pulmonary nodules are similar in size. The dominant left upper lobe pulmonary nodule is again noted. This lesion has 
elevated FDG activity with Max SUV 3.7 image 61. More inferior left upper lobe, 
lingula nodule image 82 also with elevated FDG activity in Max SUV 1.9. 
  
Abdomen/Pelvis: No lymphadenopathy or abnormal FDG activity. No bowel 
obstruction. Extensive sigmoid diverticulosis. No discrete abnormal osseous 
uptake. 
  
IMPRESSION:  
1. Two dominant left upper lobe pulmonary nodules each with elevated FDG 
activity suggesting neoplastic process. 2. No abnormal FDG uptake outside of the chest. 
 
02/22/17: CT CHEST WITHOUT CONTRAST DATED 2/22/2017. 
  
History: Follow-up pulmonary nodule. No current chest complaints. 
  
Comparison: CT chest without contrast 2/18/2016 Findings: 
CT Chest: The base of the neck is unremarkable in appearance. No evolving axillary, or 
mediastinal lymphadenopathy is seen. Evaluation of the carrie is limited due to 
noncontrast technique although no obvious bulky hilar changes are seen. The 
thoracic aorta is normal in caliber. 
  
Evaluation with lung windows demonstrates multiple noncalcified pulmonary 
nodules. The largest is once again located in the left upper lobe now seen on 
image 115. Once again, this appears to have increased in size now measuring 11 
mm in greatest transverse dimension (previously measured 9.6 mm). The increase 
in size is more so evident when comparing to more remote comparison studies from 
2013. Multiple additional bilateral pulmonary nodules are seen which are not 
felt to have significantly changed. No definite new nodule is seen. The central 
airways are patent. No pleural effusion is seen. Lungs are expanded without 
evidence for pneumothorax. 
  
Limited evaluation of the upper abdomen demonstrates no acute abnormality. The 
patient is status post cholecystectomy. A stable cystic lesion is seen in the 
upper pole of the right kidney. . The adrenal glands are unremarkable in 
appearance. 
  
IMPRESSION:   
Continued enlargement of the largest nodule located in the left upper lobe 
which now measures 11 mm in greatest transverse dimension. A neoplasm cannot be 
excluded. Given the size, further evaluation should be performed with a PET scan 
or biopsy. Initial PET scan confirmation would be recommended given the 
patient's age. CT OF THE CHEST WITH INTRAVENOUS CONTRAST  8/27/2018 
  
INDICATION: Left-sided lung cancer, status post radiation therapy, follow-up.  
  
COMPARISON: March 2018, November 2017, August 2017. 
  
FINDINGS: Decreased opacity in the left upper lobe, likely resolving post 
radiation pneumonitis changes. There are 2 areas of metallic densities, probably fiducial markers. No new abnormalities. No grossly enlarged axillary, hilar or 
mediastinal lymph nodes. There is a small left paratracheal node. Adrenal glands 
are normal size. Large simple cyst right kidney. Small hiatal hernia. No gross 
bony lesions aside from DJD.   
  
IMPRESSION:  Decreasing lung opacities on the left, probably resolving 
postradiation pneumonitis. No new abnormalities.  
  
  
IMPRESSION:  [address] Sr 56 is a 80 y.o. female with 2 left lung nodules presumed to be malignant. She completed SBRT, 6/23/2017. She is now 15 months s/p CyberKnife SBRT to the 2 lesions. She tolerated SBRT with out incident. CT scan of the chest, 8/27/18 shows resolving postradiation pneumonitis with no new abnormalities. She continues to do well with no complaints. COUNSELING AND COORDINATION OF CARE: I have had a 20 minute visit with Ms Chelsi Bullard of which greater than half has been spent counseling her about continued management of her lung nodules. CT chest discussed with Ms Chelsi Bullard. We will continue with CT w/o contrast of the chest every 6 months. I will see her shortly after. Arnel Membreno NP September 4, 2018 Portions of this note were copied from prior encounters and reviewed for accuracy, currency, and represent documentation and tasks completed during this encounter. I verify and attest these portions to be unchanged from prior visits.

## 2018-10-22 ENCOUNTER — HOSPITAL ENCOUNTER (OUTPATIENT)
Dept: MAMMOGRAPHY | Age: 83
Discharge: HOME | End: 2018-10-22
Attending: PEDIATRICS
Payer: MEDICARE

## 2018-10-22 DIAGNOSIS — Z12.31 ENCOUNTER FOR SCREENING MAMMOGRAM FOR MALIGNANT NEOPLASM OF BREAST: ICD-10-CM

## 2018-10-22 PROCEDURE — 77067 SCR MAMMO BI INCL CAD: CPT

## 2019-03-01 ENCOUNTER — HOSPITAL ENCOUNTER (OUTPATIENT)
Dept: CT IMAGING | Age: 84
Discharge: HOME | End: 2019-03-01
Attending: NURSE PRACTITIONER

## 2019-03-01 DIAGNOSIS — C34.12 MALIGNANT NEOPLASM OF UPPER LOBE OF LEFT LUNG (HCC): ICD-10-CM

## 2019-03-05 ENCOUNTER — HOSPITAL ENCOUNTER (OUTPATIENT)
Dept: RADIATION ONCOLOGY | Age: 84
Discharge: HOME | End: 2019-03-05
Payer: MEDICARE

## 2019-03-05 VITALS
HEART RATE: 69 BPM | WEIGHT: 146.7 LBS | TEMPERATURE: 97.6 F | BODY MASS INDEX: 29.63 KG/M2 | DIASTOLIC BLOOD PRESSURE: 65 MMHG | OXYGEN SATURATION: 96 % | SYSTOLIC BLOOD PRESSURE: 138 MMHG

## 2019-03-05 DIAGNOSIS — C34.12 MALIGNANT NEOPLASM OF UPPER LOBE OF LEFT LUNG (HCC): Primary | ICD-10-CM

## 2019-03-05 PROCEDURE — 99211 OFF/OP EST MAY X REQ PHY/QHP: CPT

## 2019-03-05 NOTE — PROGRESS NOTES
Patient: Nomi Corbin MRN: [id number]  SSN: [id number]    YOB: 1925  Age: 80 y.o. Sex: female      Other Providers:  Tomas Last MD    CHIEF COMPLAINT: Lung nodules    DIAGNOSIS: Pulmonary nodules presumed to be malignant    SITE TREATED AND DOSE DELIVERED: The left upper lobe nodule and lingular nodule each received 5000 cGy in 5 fractions of 1000 cGy each using CyberKnife SBRT. TREATMENT DATES: 06/12/17 through 06/23/17    HISTORY OF PRESENT ILLNESS:  Nomi Corbin is a 80 y.o. female initially seen by Dr. Ranjan Carlton at the request of Dr. Luis E Torres. The patient has a history of thyroid and breast cancers. She has a long history of lung nodules that have been followed for at least the past 4 years by Dr. Concetta Perez. She has undergone serial CT scanning and has been found to have a left upper lobe nodule and a lingular nodule. She has not had any pulmonary complaints. The lung nodules have been essentially stable dating back to 2013. However, chest CT on 02/18/16 showed slight increase compared to 2013. Chest CT on 02/22/17 showed continued enlargement of the largest nodule located in the left upper lobe now measuring 11 mm compared to 9.6 mm on prior scan. Multiple additional bilateral pulmonary nodules were seen, but they were felt not to have changed significantly. PET/CT scan on 03/23/17 showed the dominant left upper pole lobe pulmonary nodule to have elevated FDG activity with a maximum SUV of 3.7. The left lingular nodule also demonstrated mildly elevated FDG activity with maximum SUV of 1.9. These were felt to be suggestive of a neoplastic process. No abnormal FDG uptake was seen outside the chest.  On 05/16/17 she underwent navigational bronchoscopy with biopsy of the posterior segment left upper lobe nodule that was nondiagnostic. The lingular nodule was not biopsied due to its proximity to the heart.   Fiducial markers were placed around both nodules in anticipation of possible SBRT with CyberKnife. Her case was presented at multidisciplinary thoracic tumor board and recommendation was for consideration of SBRT. She went on to receive CyberKnife SBRT delivering 5000 cGy in 5 fractions of 1000 cGy each from 06/12/17 through 06/23/17. She tolerated treatment without significant difficulty. INTERVAL HISTORY: Ms. Imer Ortiz returns for follow-up 21 months after completion of CyberKnife SBRT to 2 left lung lesions. She tolerated treatment without acute side effects. CT scan of the chest on 08/23/17 showed the nodule in the left upper lobe just lateral to the superior hilum to now measure 10 x 8 mm compared with 11 x 9 mm on prior exam.  The nodule in the lingula was smaller, now measuring 8 x 6 mm compared to 9 x 9 mm on prior exam.  2 stable 3 mm nodules were again seen in the right middle lobe. A stable 4 mm nodule seen in the right lower lobe. No new or enlarging pulmonary masses were seen. CT scan of the chest on 11/27/17 showed significant interval increase in infiltrative density adjacent to previously small right upper lobe and lingular nodules. This is unclear is progressive disease vs post radiation changes. CT scan of the chest on 3/28961 showed findings to be consistent with interval treatment response with decreased size of the spiculated masses in the left upper lobe. Scattered bilateral pulmonary metastatic nodules otherwise unchanged. CT scan of the chest on 8/27/2018 showed resolving postradiation pneumonitis with no new abnormalities. CT scan of the chest on 3/1/2019 showed no significant interval change. Areas of scarring, posttreatment change are suspected at these sites. Residual tumor cannot be completely excluded, but again, there has been no significant interval change when compared with the prior study. Ms. Imer Ortiz is now 21 months out from completing radiation therapy. She continues to do very well.  She denies change in her respiratory status. She is at her baseline with regard to pulmonary function. Her energy/stamina is at her baseline. She has no complaints. PAST MEDICAL HISTORY:    Past Medical History:   Diagnosis Date    Arrhythmia 7/27/2016    Arthritis     Benign hypertensive heart disease without CHF 5/24/2016    Breast cancer ([address] 2006    LT Lumpectomy    Cancer [facility])     History of thyroid cancer and breast cancer.  Cardiac Arrhythmia, SVT 5/24/2016    History of breast cancer 5/9/2014    Hx of thyroid cancer 5/9/2014    S/p thyroidectomy     Hypertension     Hypo-osmolality and hyponatremia 5/24/2016    Hypomagnesemia 5/24/2016    Hypothyroidism 5/24/2016    Pulmonary nodule, left 5/15/2013    Left lower lobe spheres segment. A 7 mm on [facility] scan December 2012. 1.8 S UV on PET scan. 6 mm on follow up CT May 2013.  Radiation therapy complication     LT Lumpectomy     PAST SURGICAL HISTORY:   Past Surgical History:   Procedure Laterality Date    HX BREAST BIOPSY Right     10-12 yrs ago    HX BREAST BIOPSY Left 2006    LT Lumpectomy    HX BREAST LUMPECTOMY Left 2006    HX CHOLECYSTECTOMY      HX THYROIDECTOMY      X2 for cancer     MEDICATIONS:     Current Outpatient Medications:     levothyroxine (SYNTHROID) 137 mcg tablet, Take 112 mcg by mouth Daily (before breakfast). , Disp: , Rfl:     KLOR-CON 10 10 mEq tablet, TAKE 2 TABLETS TWICE A DAY, Disp: 360 Tab, Rfl: 3    amiodarone (PACERONE) 100 mg tablet, Take 1 Tab by mouth daily. , Disp: 90 Tab, Rfl: 3    MAGNESIUM PO, Take 160 mg by mouth daily. , Disp: , Rfl:     dextromethorphan-guaiFENesin (ROBITUSSIN-DM)  mg/5 mL syrup, Take 10 mL by mouth every four (4) hours as needed for Cough. , Disp: , Rfl:     indapamide (LOZOL) 2.5 mg tablet, Take 2.5 mg by mouth two (2) times a day., Disp: , Rfl:     amLODIPine (NORVASC) 5 mg tablet, Take 5 mg by mouth daily. , Disp: , Rfl:     LUTEIN PO, Take 10 mg by mouth daily.  1 tab po qd, Disp: , Rfl:   CALCIUM CARBONATE/VITAMIN D3 (CALCIUM + D PO), Take  by mouth. 800 mg Calcium/2000 units vitamin D daily  Indications: with magnesium, Disp: , Rfl:     multivitamin (ONE A DAY) tablet, Take 1 Tab by mouth daily. , Disp: , Rfl:     ALLERGIES:   No Known Allergies    SOCIAL HISTORY:   Social History     Socioeconomic History    Marital status:      Spouse name: Not on file    Number of children: Not on file    Years of education: Not on file    Highest education level: Not on file   Social Needs    Financial resource strain: Not on file    Food insecurity - worry: Not on file    Food insecurity - inability: Not on file   [employer] needs - medical: Not on file   [employer] needs - non-medical: Not on file   Occupational History    Not on file   Tobacco Use    Smoking status: Never Smoker    Smokeless tobacco: Never Used   Substance and Sexual Activity    Alcohol use: No     Alcohol/week: 0.0 oz    Drug use: No    Sexual activity: No   Other Topics Concern    Not on file   Social History Narrative    Not on file     FAMILY HISTORY:   Family History   Problem Relation Age of Onset    Breast Cancer Neg Hx      PHYSICAL EXAMINATION:   ECOG Performance status 1  VITAL SIGNS:  Blood pressure 138/65  Pulse 69  Temperature 97.6  Weight 146.7      GENERAL: The patient is well-developed, ambulatory, alert and in no acute distress. RESPIRATORY: Lungs are clear to auscultation. There is normal respiratory effort. PATHOLOGY:    As detailed in HPI.     LABORATORY:   Lab Results   Component Value Date/Time    Sodium 137 02/26/2018 11:00 AM    Potassium 3.7 02/26/2018 11:00 AM    Chloride 99 02/26/2018 11:00 AM    CO2 31 02/26/2018 11:00 AM    Anion gap 7 02/26/2018 11:00 AM    Glucose 106 (H) 02/26/2018 11:00 AM    BUN 16 02/26/2018 11:00 AM    Creatinine 0.90 02/26/2018 11:00 AM    GFR est AA >60 02/26/2018 11:00 AM    GFR est non-AA >60 02/26/2018 11:00 AM    Calcium 9.2 02/26/2018 11:00 AM    Magnesium 1.9 11/17/2017 01:25 PM    Albumin 3.8 02/26/2018 11:00 AM    Protein, total 8.1 02/26/2018 11:00 AM    Globulin 4.3 02/26/2018 11:00 AM    A-G Ratio 0.9 02/26/2018 11:00 AM    AST (SGOT) 17 02/26/2018 11:00 AM    ALT (SGPT) 23 02/26/2018 11:00 AM     Lab Results   Component Value Date/Time    WBC 5.7 05/11/2014 05:25 AM    HGB 14.7 05/11/2014 05:25 AM    HCT 42.2 05/11/2014 05:25 AM    PLATELET 445 26/70/3161 05:25 AM       RADIOLOGY:       CT OF THE CHEST WITH INTRAVENOUS CONTRAST  8/27/2018     INDICATION: Left-sided lung cancer, status post radiation therapy, follow-up.      COMPARISON: March 2018, November 2017, August 2017.     FINDINGS: Decreased opacity in the left upper lobe, likely resolving post  radiation pneumonitis changes. There are 2 areas of metallic densities, probably  fiducial markers. No new abnormalities. No grossly enlarged axillary, hilar or  mediastinal lymph nodes. There is a small left paratracheal node. Adrenal glands  are normal size. Large simple cyst right kidney. Small hiatal hernia. No gross  bony lesions aside from DJD.       IMPRESSION:  Decreasing lung opacities on the left, probably resolving  postradiation pneumonitis. No new abnormalities.       CT chest without contrast  3/1/2019    COMPARISON: 8/27/2018     FINDINGS: Multiple fiducial markers again seen within the left upper lobe. There  is adjacent groundglass opacity, similar to the findings seen previously, likely  reflecting posttreatment change. In addition, within the left lower lobe, there  are multiple fiducial markers with an area of irregular airspace opacity, stable  when compared with the prior exam. There are stable right middle lobe pulmonary  nodules measuring up to 3 mm (image 37). There is also a stable right lower lobe  pulmonary nodule measuring 5 mm (image 39). There is a small stable right lower  lobe pulmonary nodule measuring 3 mm (image 45).  There is a stable left lower  lobe pulmonary nodule measuring 4 mm (image 48). There is a stable right upper  lobe pulmonary nodule measuring 4 mm (image 20). There is a stable left upper  lobe pulmonary nodule measuring 3 mm (image 20). No new mediastinal, hilar, or  axillary lymphadenopathy. No pleural or pericardial effusion seen.     Evaluation of the upper abdomen demonstrates no new abnormality. Clips are  present from prior cholecystectomy.     IMPRESSION:  1. Overall, there has been no significant interval change when compared with the  prior exam. Fiducial markers are present within the left lower lobe and within  the left upper lobe. Areas of scarring, posttreatment change are suspected at  these sites. Residual tumor cannot be completely excluded, but again, there has  been no significant interval change when compared with the prior study. Attention to this area on follow-up exam is recommended. 2. Stable multiple bilateral pulmonary nodules are present      IMPRESSION:  [id number] Sr [address] is a 80 y.o. female with 2 left lung nodules presumed to be malignant. She completed SBRT, 6/23/2017. Ms Allyssa Servin is now 21 months s/p CyberKnife SBRT to the 2 lesions. She tolerated SBRT with out incident. CT scan of the chest, 3/1/19 showed stable disease. Again notes posttreatment changes which residual tumor cannot be completley excluded, but no significant change when compared. She continues to do well with no complaints. COUNSELING AND COORDINATION OF CARE: I have had a 25 minute visit with Ms Allyssa Servin of which greater than half has been spent counseling her about continued management of her lung nodules. CT chest discussed with Ms Allyssa Servin. We will continue with CT w/o contrast of the chest every 6 months, years 2-5. I will see her shortly after.       Deepti Aguero NP   March 5, 2019      Portions of this note were copied from prior encounters and reviewed for accuracy, currency, and represent documentation and tasks completed during this encounter. I verify and attest these portions to be unchanged from prior visits.

## 2019-03-05 NOTE — NURSE NAVIGATOR
F/u lung cancer. SBRT x 5 ending 6-23-17. CT chest w/o 3-1-19.   Using 4 prong cane for ambulation assist.    Liam Velazquez RN

## 2019-09-03 ENCOUNTER — HOSPITAL ENCOUNTER (OUTPATIENT)
Dept: CT IMAGING | Age: 84
Discharge: HOME | End: 2019-09-03
Attending: NURSE PRACTITIONER

## 2019-09-03 DIAGNOSIS — C34.12 MALIGNANT NEOPLASM OF UPPER LOBE OF LEFT LUNG (HCC): ICD-10-CM

## 2019-09-10 ENCOUNTER — HOSPITAL ENCOUNTER (OUTPATIENT)
Dept: RADIATION ONCOLOGY | Age: 84
Discharge: HOME | End: 2019-09-10
Payer: MEDICARE

## 2019-09-10 VITALS
OXYGEN SATURATION: 95 % | WEIGHT: 142.4 LBS | DIASTOLIC BLOOD PRESSURE: 67 MMHG | BODY MASS INDEX: 28.76 KG/M2 | TEMPERATURE: 97.6 F | HEART RATE: 63 BPM | SYSTOLIC BLOOD PRESSURE: 151 MMHG | RESPIRATION RATE: 16 BRPM

## 2019-09-10 DIAGNOSIS — C34.92 MALIGNANT NEOPLASM OF LEFT LUNG, UNSPECIFIED PART OF LUNG (HCC): Primary | ICD-10-CM

## 2019-09-10 PROCEDURE — 99211 OFF/OP EST MAY X REQ PHY/QHP: CPT

## 2019-09-10 NOTE — PROGRESS NOTES
Patient: Carla Antonio MRN: [id number]  SSN: [id number]    YOB: 1925  Age: 80 y.o. Sex: female      Other Providers:  Jose Santa MD    CHIEF COMPLAINT: Lung nodules    DIAGNOSIS: Pulmonary nodules presumed to be malignant    SITE TREATED AND DOSE DELIVERED: The left upper lobe nodule and lingular nodule each received 5000 cGy in 5 fractions of 1000 cGy each using CyberKnife SBRT. TREATMENT DATES: 06/12/17 through 06/23/17    HISTORY OF PRESENT ILLNESS:  Carla Antonio is a 80 y.o. female initially seen by Dr. María Morton at the request of Dr. Ila Fothergill. The patient has a history of thyroid and breast cancers. She has a long history of lung nodules that have been followed for at least the past 4 years by Dr. Julissa Hinds. She has undergone serial CT scanning and has been found to have a left upper lobe nodule and a lingular nodule. She has not had any pulmonary complaints. The lung nodules have been essentially stable dating back to 2013. However, chest CT on 02/18/16 showed slight increase compared to 2013. Chest CT on 02/22/17 showed continued enlargement of the largest nodule located in the left upper lobe now measuring 11 mm compared to 9.6 mm on prior scan. Multiple additional bilateral pulmonary nodules were seen, but they were felt not to have changed significantly. PET/CT scan on 03/23/17 showed the dominant left upper pole lobe pulmonary nodule to have elevated FDG activity with a maximum SUV of 3.7. The left lingular nodule also demonstrated mildly elevated FDG activity with maximum SUV of 1.9. These were felt to be suggestive of a neoplastic process. No abnormal FDG uptake was seen outside the chest.  On 05/16/17 she underwent navigational bronchoscopy with biopsy of the posterior segment left upper lobe nodule that was nondiagnostic. The lingular nodule was not biopsied due to its proximity to the heart.   Fiducial markers were placed around both nodules in anticipation of possible SBRT with CyberKnife. Her case was presented at multidisciplinary thoracic tumor board and recommendation was for consideration of SBRT. She went on to receive CyberKnife SBRT delivering 5000 cGy in 5 fractions of 1000 cGy each from 06/12/17 through 06/23/17. She tolerated treatment without significant difficulty. INTERVAL HISTORY: Ms. Heather Goyal returns for follow-up 21 months after completion of CyberKnife SBRT to 2 left lung lesions. She tolerated treatment without acute side effects. CT scan of the chest on 08/23/17 showed the nodule in the left upper lobe just lateral to the superior hilum to now measure 10 x 8 mm compared with 11 x 9 mm on prior exam.  The nodule in the lingula was smaller, now measuring 8 x 6 mm compared to 9 x 9 mm on prior exam.  2 stable 3 mm nodules were again seen in the right middle lobe. A stable 4 mm nodule seen in the right lower lobe. No new or enlarging pulmonary masses were seen. CT scan of the chest on 11/27/17 showed significant interval increase in infiltrative density adjacent to previously small right upper lobe and lingular nodules. This is unclear is progressive disease vs post radiation changes. CT scan of the chest on 3/36349 showed findings to be consistent with interval treatment response with decreased size of the spiculated masses in the left upper lobe. Scattered bilateral pulmonary metastatic nodules otherwise unchanged. CT scan of the chest on 8/27/2018 showed resolving postradiation pneumonitis with no new abnormalities. CT scan of the chest on 3/1/2019 showed no significant interval change. Areas of scarring, posttreatment change are suspected at these sites. Residual tumor cannot be completely excluded, but again, there has been no significant interval change when compared with the prior study. Ms. Heather Goyal is now 21 months out from completing radiation therapy. She continues to do very well.  She denies change in her respiratory status. She is at her baseline with regard to pulmonary function. Her energy/stamina is at her baseline. She has no complaints. Ms Coco Aamya is now 27 months out from completing radiation therapy. She continues to do well. She denies cough or dyspnea. Good appetite. No trouble with swallowing. Remains active working in her garden. No complaints. PAST MEDICAL HISTORY:    Past Medical History:   Diagnosis Date    Arrhythmia 7/27/2016    Arthritis     Benign hypertensive heart disease without CHF 5/24/2016    Breast cancer ([address] 2006    LT Lumpectomy    Cancer [facility])     History of thyroid cancer and breast cancer.  Cardiac Arrhythmia, SVT 5/24/2016    History of breast cancer 5/9/2014    Hx of thyroid cancer 5/9/2014    S/p thyroidectomy     Hypertension     Hypo-osmolality and hyponatremia 5/24/2016    Hypomagnesemia 5/24/2016    Hypothyroidism 5/24/2016    Pulmonary nodule, left 5/15/2013    Left lower lobe spheres segment. A 7 mm on [facility] scan December 2012. 1.8 S UV on PET scan. 6 mm on follow up CT May 2013.  Radiation therapy complication     LT Lumpectomy     PAST SURGICAL HISTORY:   Past Surgical History:   Procedure Laterality Date    HX BREAST BIOPSY Right     10-12 yrs ago    HX BREAST BIOPSY Left 2006    LT Lumpectomy    HX BREAST LUMPECTOMY Left 2006    HX CHOLECYSTECTOMY      HX THYROIDECTOMY      X2 for cancer     MEDICATIONS:     Current Outpatient Medications:     cholecalciferol (VITAMIN D3) 1,000 unit cap, Take  by mouth daily. , Disp: , Rfl:     hydrOXYzine HCl (ATARAX) 25 mg tablet, Take  by mouth every six (6) hours as needed for Itching., Disp: , Rfl:     potassium chloride SR (KLOR-CON 10) 10 mEq tablet, TAKE 2 TABLETS TWICE A DAY, Disp: 360 Tab, Rfl: 3    amiodarone (PACERONE) 100 mg tablet, TAKE 1 TABLET DAILY, Disp: 90 Tab, Rfl: 3    levothyroxine (SYNTHROID) 137 mcg tablet, Take 137 mcg by mouth Daily (before breakfast). , Disp: , Rfl:    dextromethorphan-guaiFENesin (ROBITUSSIN-DM)  mg/5 mL syrup, Take 10 mL by mouth every four (4) hours as needed for Cough. , Disp: , Rfl:     indapamide (LOZOL) 2.5 mg tablet, Take 2.5 mg by mouth two (2) times a day., Disp: , Rfl:     amLODIPine (NORVASC) 5 mg tablet, Take 5 mg by mouth daily. , Disp: , Rfl:     LUTEIN PO, Take 20 mg by mouth daily. 1 tab po qd , Disp: , Rfl:     CALCIUM CARBONATE/VITAMIN D3 (CALCIUM + D PO), Take  by mouth. 800 mg Calcium/2000 units vitamin D daily  Indications: with magnesium, Disp: , Rfl:     multivitamin (ONE A DAY) tablet, Take 1 Tab by mouth daily. , Disp: , Rfl:     ALLERGIES:   No Known Allergies    SOCIAL HISTORY:   Social History     Socioeconomic History    Marital status:      Spouse name: Not on file    Number of children: Not on file    Years of education: Not on file    Highest education level: Not on file   Occupational History    Not on file   Social Needs    Financial resource strain: Not on file    Food insecurity:     Worry: Not on file     Inability: Not on file    Transportation needs:     Medical: Not on file     Non-medical: Not on file   Tobacco Use    Smoking status: Never Smoker    Smokeless tobacco: Never Used   Substance and Sexual Activity    Alcohol use: No     Alcohol/week: 0.0 standard drinks    Drug use: No    Sexual activity: Never   Lifestyle    Physical activity:     Days per week: Not on file     Minutes per session: Not on file    Stress: Not on file   Relationships    Social connections:     Talks on phone: Not on file     Gets together: Not on file     Attends Holiness service: Not on file     Active member of club or organization: Not on file     Attends meetings of clubs or organizations: Not on file     Relationship status: Not on file    Intimate partner violence:     Fear of current or ex partner: Not on file     Emotionally abused: Not on file     Physically abused: Not on file     Forced sexual activity: Not on file   Other Topics Concern    Not on file   Social History Narrative    Not on file     FAMILY HISTORY:   Family History   Problem Relation Age of Onset    Breast Cancer Neg Hx      PHYSICAL EXAMINATION:   ECOG Performance status 1  VITAL SIGNS:  Blood pressure 151/67  Pulse 63  Temperature  97.6 Weight 142.4     GENERAL: The patient is well-developed, ambulatory, alert and in no acute distress. RESPIRATORY: Lungs are clear to auscultation. There is normal respiratory effort. PATHOLOGY:    As detailed in HPI. LABORATORY:   Lab Results   Component Value Date/Time    Sodium 137 02/26/2018 11:00 AM    Potassium 3.7 02/26/2018 11:00 AM    Chloride 99 02/26/2018 11:00 AM    CO2 31 02/26/2018 11:00 AM    Anion gap 7 02/26/2018 11:00 AM    Glucose 106 (H) 02/26/2018 11:00 AM    BUN 16 02/26/2018 11:00 AM    Creatinine 0.90 02/26/2018 11:00 AM    GFR est AA >60 02/26/2018 11:00 AM    GFR est non-AA >60 02/26/2018 11:00 AM    Calcium 9.2 02/26/2018 11:00 AM    Magnesium 1.9 11/17/2017 01:25 PM    Albumin 3.8 02/26/2018 11:00 AM    Protein, total 8.1 02/26/2018 11:00 AM    Globulin 4.3 02/26/2018 11:00 AM    A-G Ratio 0.9 02/26/2018 11:00 AM    AST (SGOT) 17 02/26/2018 11:00 AM    ALT (SGPT) 23 02/26/2018 11:00 AM     Lab Results   Component Value Date/Time    WBC 5.7 05/11/2014 05:25 AM    HGB 14.7 05/11/2014 05:25 AM    HCT 42.2 05/11/2014 05:25 AM    PLATELET 889 27/84/8518 05:25 AM       RADIOLOGY:       CT OF THE CHEST WITH INTRAVENOUS CONTRAST  8/27/2018     INDICATION: Left-sided lung cancer, status post radiation therapy, follow-up.      COMPARISON: March 2018, November 2017, August 2017.     FINDINGS: Decreased opacity in the left upper lobe, likely resolving post  radiation pneumonitis changes. There are 2 areas of metallic densities, probably fiducial markers. No new abnormalities. No grossly enlarged axillary, hilar or mediastinal lymph nodes. There is a small left paratracheal node. Adrenal glands are normal size. Large simple cyst right kidney. Small hiatal hernia. No gross bony lesions aside from DJD.       IMPRESSION:  Decreasing lung opacities on the left, probably resolving  postradiation pneumonitis. No new abnormalities.       CT chest without contrast  3/1/2019    COMPARISON: 8/27/2018     FINDINGS: Multiple fiducial markers again seen within the left upper lobe. There  is adjacent groundglass opacity, similar to the findings seen previously, likely  reflecting posttreatment change. In addition, within the left lower lobe, there  are multiple fiducial markers with an area of irregular airspace opacity, stable  when compared with the prior exam. There are stable right middle lobe pulmonary  nodules measuring up to 3 mm (image 37). There is also a stable right lower lobe  pulmonary nodule measuring 5 mm (image 39). There is a small stable right lower  lobe pulmonary nodule measuring 3 mm (image 45). There is a stable left lower  lobe pulmonary nodule measuring 4 mm (image 48). There is a stable right upper  lobe pulmonary nodule measuring 4 mm (image 20). There is a stable left upper  lobe pulmonary nodule measuring 3 mm (image 20). No new mediastinal, hilar, or  axillary lymphadenopathy. No pleural or pericardial effusion seen.     Evaluation of the upper abdomen demonstrates no new abnormality. Clips are  present from prior cholecystectomy.     IMPRESSION:  1. Overall, there has been no significant interval change when compared with the  prior exam. Fiducial markers are present within the left lower lobe and within  the left upper lobe. Areas of scarring, posttreatment change are suspected at  these sites. Residual tumor cannot be completely excluded, but again, there has  been no significant interval change when compared with the prior study. Attention to this area on follow-up exam is recommended.   2. Stable multiple bilateral pulmonary nodules are present    CT CHEST without CONTRAST  9/3/2019     INDICATION: Left upper lobe lung cancer, status post XRT, follow-up.     COMPARISON: March 2019, August 2018, March 2018 and November 2017.     FINDINGS:  There are fiducial markers in the left lung. There is some stranding densities in the left upper lobe and lingula. Mild thickening of the major fissure. No new soft tissue densities. No grossly enlarged axillary, hilar or mediastinal lymph nodes. There are dense aortic and coronary calcifications. No airspace disease or pleural effusion. Heart size normal. Adrenal glands normal size. Simple cyst right kidney. There are cholecystectomy clips. No gross bony lesions.      IMPRESSION:  Stable posttreatment changes left lung without new soft tissue to suggest tumor recurrence or progression. IMPRESSION:  [address] Sr 56 is a 80 y.o. female with 2 left lung nodules presumed to be malignant. She completed SBRT, 6/23/2017. Ms Coco Amaya is now 32 months s/p CyberKnife SBRT to the 2 lesions. She tolerated SBRT with out incident. CT scan of the chest, 9/3/19 reveals stable post treatment changes with no evidence to suggest tumor recurrence of progression. COUNSELING AND COORDINATION OF CARE: I have had a 20 minute visit with Ms Coco Amaya of which greater than half has been spent counseling her about continued management of her lung nodules. CT chest discussed with Ms Coco Amaya. We will continue with CT w/o contrast of the chest every 6 months, years 2-5. I will see her shortly after. Ya Mcginnis NP   September 10, 2019      Portions of this note were copied from prior encounters and reviewed for accuracy, currency, and represent documentation and tasks completed during this encounter. I verify and attest these portions to be unchanged from prior visits.

## 2020-03-04 ENCOUNTER — HOSPITAL ENCOUNTER (OUTPATIENT)
Dept: CT IMAGING | Age: 85
Discharge: HOME | End: 2020-03-04
Attending: NURSE PRACTITIONER

## 2020-03-04 DIAGNOSIS — C34.92 MALIGNANT NEOPLASM OF LEFT LUNG, UNSPECIFIED PART OF LUNG (HCC): ICD-10-CM

## 2020-03-10 ENCOUNTER — HOSPITAL ENCOUNTER (OUTPATIENT)
Dept: RADIATION ONCOLOGY | Age: 85
Discharge: HOME | End: 2020-03-10
Payer: MEDICARE

## 2020-03-10 VITALS
RESPIRATION RATE: 16 BRPM | TEMPERATURE: 97.6 F | SYSTOLIC BLOOD PRESSURE: 151 MMHG | HEART RATE: 66 BPM | WEIGHT: 144.7 LBS | BODY MASS INDEX: 29.23 KG/M2 | OXYGEN SATURATION: 96 % | DIASTOLIC BLOOD PRESSURE: 59 MMHG

## 2020-03-10 DIAGNOSIS — R91.8 LUNG NODULES: Primary | ICD-10-CM

## 2020-03-10 PROCEDURE — 99211 OFF/OP EST MAY X REQ PHY/QHP: CPT

## 2020-03-10 NOTE — PROGRESS NOTES
CT Scan Follow Up    06/23/2017 - SBRT x 5    CT Chest (03/04/2020): stable post surgical change    Uses 4 prong cane and holds onto daughter's arm        Yoel Bishop CMA

## 2020-03-10 NOTE — PROGRESS NOTES
Patient: Eh Mcmillan MRN: [id number]  SSN: [id number]    YOB: 1925  Age: 80 y.o. Sex: female      Other Providers:  Nina Rice MD    CHIEF COMPLAINT: Lung nodules    DIAGNOSIS: Pulmonary nodules presumed to be malignant    SITE TREATED AND DOSE DELIVERED: The left upper lobe nodule and lingular nodule each received 5000 cGy in 5 fractions of 1000 cGy each using CyberKnife SBRT. TREATMENT DATES: 06/12/17 through 06/23/17    HISTORY OF PRESENT ILLNESS:  Eh Mcmillan is a 80 y.o. female initially seen by Dr. Soniya Lucio at the request of Dr. Bernabe Cervantes. The patient has a history of thyroid and breast cancers. She has a long history of lung nodules that have been followed for at least the past 4 years by Dr. Flory Kim. She has undergone serial CT scanning and has been found to have a left upper lobe nodule and a lingular nodule. She has not had any pulmonary complaints. The lung nodules have been essentially stable dating back to 2013. However, chest CT on 02/18/16 showed slight increase compared to 2013. Chest CT on 02/22/17 showed continued enlargement of the largest nodule located in the left upper lobe now measuring 11 mm compared to 9.6 mm on prior scan. Multiple additional bilateral pulmonary nodules were seen, but they were felt not to have changed significantly. PET/CT scan on 03/23/17 showed the dominant left upper pole lobe pulmonary nodule to have elevated FDG activity with a maximum SUV of 3.7. The left lingular nodule also demonstrated mildly elevated FDG activity with maximum SUV of 1.9. These were felt to be suggestive of a neoplastic process. No abnormal FDG uptake was seen outside the chest.  On 05/16/17 she underwent navigational bronchoscopy with biopsy of the posterior segment left upper lobe nodule that was nondiagnostic. The lingular nodule was not biopsied due to its proximity to the heart.   Fiducial markers were placed around both nodules in anticipation of possible SBRT with CyberKnife. Her case was presented at multidisciplinary thoracic tumor board and recommendation was for consideration of SBRT. She went on to receive CyberKnife SBRT delivering 5000 cGy in 5 fractions of 1000 cGy each from 06/12/17 through 06/23/17. She tolerated treatment without significant difficulty. INTERVAL HISTORY: Ms. Renee Ford returns for follow-up 21 months after completion of CyberKnife SBRT to 2 left lung lesions. She tolerated treatment without acute side effects. CT scan of the chest on 08/23/17 showed the nodule in the left upper lobe just lateral to the superior hilum to now measure 10 x 8 mm compared with 11 x 9 mm on prior exam.  The nodule in the lingula was smaller, now measuring 8 x 6 mm compared to 9 x 9 mm on prior exam.  2 stable 3 mm nodules were again seen in the right middle lobe. A stable 4 mm nodule seen in the right lower lobe. No new or enlarging pulmonary masses were seen. CT scan of the chest on 11/27/17 showed significant interval increase in infiltrative density adjacent to previously small right upper lobe and lingular nodules. This is unclear is progressive disease vs post radiation changes. CT scan of the chest on 3/80201 showed findings to be consistent with interval treatment response with decreased size of the spiculated masses in the left upper lobe. Scattered bilateral pulmonary metastatic nodules otherwise unchanged. CT scan of the chest on 8/27/2018 showed resolving postradiation pneumonitis with no new abnormalities. CT scan of the chest on 3/1/2019 showed no significant interval change. Areas of scarring, posttreatment change are suspected at these sites. Residual tumor cannot be completely excluded, but again, there has been no significant interval change when compared with the prior study. Ms. Renee Ford is now 21 months out from completing radiation therapy. She continues to do very well.  She denies change in her respiratory status. She is at her baseline with regard to pulmonary function. Her energy/stamina is at her baseline. She has no complaints. Ms Michelle Mei is now 27 months out from completing radiation therapy. She continues to do well. She denies cough or dyspnea. Good appetite. No trouble with swallowing. Remains active working in her garden. No complaints. Ms Michelle Mei is now 33 months out from completing radiation therapy. CT remains stable. She continues to do well. Denies cough or shortness of breath. Good appetite. No swallowing issues. No complaints. PAST MEDICAL HISTORY:    Past Medical History:   Diagnosis Date    Arrhythmia 7/27/2016    Arthritis     Benign hypertensive heart disease without CHF 5/24/2016    Breast cancer ([facility] [address] 2006    LT Lumpectomy    Cancer [facility])     History of thyroid cancer and breast cancer.  Cardiac Arrhythmia, SVT 5/24/2016    History of breast cancer 5/9/2014    Hx of thyroid cancer 5/9/2014    S/p thyroidectomy     Hypertension     Hypo-osmolality and hyponatremia 5/24/2016    Hypomagnesemia 5/24/2016    Hypothyroidism 5/24/2016    Pulmonary nodule, left 5/15/2013    Left lower lobe spheres segment. A 7 mm on [facility] [place] scan December 2012. 1.8 S UV on PET scan. 6 mm on follow up CT May 2013.  Radiation therapy complication     LT Lumpectomy     PAST SURGICAL HISTORY:   Past Surgical History:   Procedure Laterality Date    HX BREAST BIOPSY Right     10-12 yrs ago    HX BREAST BIOPSY Left 2006    LT Lumpectomy    HX BREAST LUMPECTOMY Left 2006    HX CHOLECYSTECTOMY      HX THYROIDECTOMY      X2 for cancer     MEDICATIONS:     Current Outpatient Medications:     amiodarone (CORDARONE) 200 mg tablet, Take 1/2 tablet a day., Disp: 90 Tab, Rfl: 3    vit A,C,E-zinc-copper (PRESERVISION AREDS) cap capsule, Take 1 Cap by mouth., Disp: , Rfl:     cholecalciferol (VITAMIN D3) 1,000 unit cap, Take  by mouth daily. , Disp: , Rfl:     hydrOXYzine HCl (ATARAX) 25 mg tablet, Take  by mouth every six (6) hours as needed for Itching., Disp: , Rfl:     potassium chloride SR (KLOR-CON 10) 10 mEq tablet, TAKE 2 TABLETS TWICE A DAY, Disp: 360 Tab, Rfl: 3    levothyroxine (SYNTHROID) 137 mcg tablet, Take 137 mcg by mouth Daily (before breakfast). , Disp: , Rfl:     dextromethorphan-guaiFENesin (ROBITUSSIN-DM)  mg/5 mL syrup, Take 10 mL by mouth every four (4) hours as needed for Cough. , Disp: , Rfl:     indapamide (LOZOL) 2.5 mg tablet, Take 2.5 mg by mouth two (2) times a day., Disp: , Rfl:     amLODIPine (NORVASC) 5 mg tablet, Take 5 mg by mouth daily. , Disp: , Rfl:     CALCIUM CARBONATE/VITAMIN D3 (CALCIUM + D PO), Take  by mouth. 800 mg Calcium/2000 units vitamin D daily  Indications: with magnesium, Disp: , Rfl:     multivitamin (ONE A DAY) tablet, Take 1 Tab by mouth daily. , Disp: , Rfl:     ALLERGIES:   No Known Allergies    SOCIAL HISTORY:   Social History     Socioeconomic History    Marital status:      Spouse name: Not on file    Number of children: Not on file    Years of education: Not on file    Highest education level: Not on file   Occupational History    Not on file   Social Needs    Financial resource strain: Not on file    Food insecurity:     Worry: Not on file     Inability: Not on file    Transportation needs:     Medical: Not on file     Non-medical: Not on file   Tobacco Use    Smoking status: Never Smoker    Smokeless tobacco: Never Used   Substance and Sexual Activity    Alcohol use: No     Alcohol/week: 0.0 standard drinks    Drug use: No    Sexual activity: Never   Lifestyle    Physical activity:     Days per week: Not on file     Minutes per session: Not on file    Stress: Not on file   Relationships    Social connections:     Talks on phone: Not on file     Gets together: Not on file     Attends [employer] service: Not on file     Active member of club or organization: Not on file     Attends meetings of clubs or organizations: Not on file     Relationship status: Not on file    Intimate partner violence:     Fear of current or ex partner: Not on file     Emotionally abused: Not on file     Physically abused: Not on file     Forced sexual activity: Not on file   Other Topics Concern    Not on file   Social History Narrative    Not on file     FAMILY HISTORY:   Family History   Problem Relation Age of Onset    Breast Cancer Neg Hx      PHYSICAL EXAMINATION:   ECOG Performance status 1  VITAL SIGNS:  Blood pressure 151/59  Pulse 66  Temperature  97.6  Weight 144.7     GENERAL: The patient is well-developed, ambulatory, alert and in no acute distress. RESPIRATORY: Lungs are clear to auscultation. There is normal respiratory effort. PATHOLOGY:    As detailed in HPI.     LABORATORY:   Lab Results   Component Value Date/Time    Sodium 137 02/26/2018 11:00 AM    Potassium 3.7 02/26/2018 11:00 AM    Chloride 99 02/26/2018 11:00 AM    CO2 31 02/26/2018 11:00 AM    Anion gap 7 02/26/2018 11:00 AM    Glucose 106 (H) 02/26/2018 11:00 AM    BUN 16 02/26/2018 11:00 AM    Creatinine 0.90 02/26/2018 11:00 AM    GFR est AA >60 02/26/2018 11:00 AM    GFR est non-AA >60 02/26/2018 11:00 AM    Calcium 9.2 02/26/2018 11:00 AM    Magnesium 1.9 11/17/2017 01:25 PM    Albumin 3.8 02/26/2018 11:00 AM    Protein, total 8.1 02/26/2018 11:00 AM    Globulin 4.3 02/26/2018 11:00 AM    A-G Ratio 0.9 02/26/2018 11:00 AM    AST (SGOT) 17 02/26/2018 11:00 AM    ALT (SGPT) 23 02/26/2018 11:00 AM     Lab Results   Component Value Date/Time    WBC 5.7 05/11/2014 05:25 AM    HGB 14.7 05/11/2014 05:25 AM    HCT 42.2 05/11/2014 05:25 AM    PLATELET 574 83/00/6685 05:25 AM       RADIOLOGY:       CT OF THE CHEST WITH INTRAVENOUS CONTRAST  8/27/2018     INDICATION: Left-sided lung cancer, status post radiation therapy, follow-up.      COMPARISON: March 2018, November 2017, August 2017.     FINDINGS: Decreased opacity in the left upper lobe, likely resolving post  radiation pneumonitis changes. There are 2 areas of metallic densities, probably fiducial markers. No new abnormalities. No grossly enlarged axillary, hilar or mediastinal lymph nodes. There is a small left paratracheal node. Adrenal glands are normal size. Large simple cyst right kidney. Small hiatal hernia. No gross bony lesions aside from DJD.       IMPRESSION:  Decreasing lung opacities on the left, probably resolving  postradiation pneumonitis. No new abnormalities.       CT chest without contrast  3/1/2019    COMPARISON: 8/27/2018     FINDINGS: Multiple fiducial markers again seen within the left upper lobe. There  is adjacent groundglass opacity, similar to the findings seen previously, likely  reflecting posttreatment change. In addition, within the left lower lobe, there  are multiple fiducial markers with an area of irregular airspace opacity, stable  when compared with the prior exam. There are stable right middle lobe pulmonary  nodules measuring up to 3 mm (image 37). There is also a stable right lower lobe  pulmonary nodule measuring 5 mm (image 39). There is a small stable right lower  lobe pulmonary nodule measuring 3 mm (image 45). There is a stable left lower  lobe pulmonary nodule measuring 4 mm (image 48). There is a stable right upper  lobe pulmonary nodule measuring 4 mm (image 20). There is a stable left upper  lobe pulmonary nodule measuring 3 mm (image 20). No new mediastinal, hilar, or  axillary lymphadenopathy. No pleural or pericardial effusion seen.     Evaluation of the upper abdomen demonstrates no new abnormality. Clips are  present from prior cholecystectomy.     IMPRESSION:  1. Overall, there has been no significant interval change when compared with the  prior exam. Fiducial markers are present within the left lower lobe and within  the left upper lobe. Areas of scarring, posttreatment change are suspected at  these sites.  Residual tumor cannot be completely excluded, but again, there has  been no significant interval change when compared with the prior study. Attention to this area on follow-up exam is recommended. 2. Stable multiple bilateral pulmonary nodules are present    CT CHEST without CONTRAST  9/3/2019     INDICATION: Left upper lobe lung cancer, status post XRT, follow-up.     COMPARISON: March 2019, August 2018, March 2018 and November 2017.     FINDINGS:  There are fiducial markers in the left lung. There is some stranding densities in the left upper lobe and lingula. Mild thickening of the major fissure. No new soft tissue densities. No grossly enlarged axillary, hilar or mediastinal lymph nodes. There are dense aortic and coronary calcifications. No airspace disease or pleural effusion. Heart size normal. Adrenal glands normal size. Simple cyst right kidney. There are cholecystectomy clips. No gross bony lesions.      IMPRESSION:  Stable posttreatment changes left lung without new soft tissue to suggest tumor recurrence or progression. CT of the chest without contrast 3/4/2020     CLINICAL INDICATION: Lung cancer, status post CyberKnife procedure     COMPARISON: Chest CT dated 9/3/2019     FINDINGS: No mediastinal or axillary adenopathy. No pleural or pericardial  effusion. Fiducial markers again noted in the left upper lobe with irregular  density surrounding the markers most likely represent postsurgical change and  scar tissue. This is not significantly change from the prior exam. There is a  stable 3 mm pulmonary nodule in the medial aspect of the left upper lobe on  image 18 and a stable 4 mm pulmonary nodule in the right upper lobe on image 18. There is a stable 5 mm pulmonary nodule in the right lower lobe on image 39 with  multiple stable smaller pulmonary nodules in the superior aspect of the right  lower lobe and right middle lobe. No airspace consolidation evident.  There is a  tiny calcified pulmonary nodule at the left lung base.     Limited evaluation of the upper abdomen shows the adrenal glands to be normal.  There is incomplete evaluation of a large right renal cyst. Cholecystectomy  clips are present.     No aggressive osseous lesions identified.     IMPRESSION:  1. Stable postsurgical change in the left upper lobe at the site of the fiducial  markers in keeping with the patient's prior surgical resection site. No strong  findings to suspect local tumor recurrence. 2. Multiple bilateral noncalcified pulmonary nodules measuring from 3 to 5 mm,  unchanged. 3. No acute cardiopulmonary abnormality. IMPRESSION:  [address] Sr 56 is a 80 y.o. female with 2 left lung nodules presumed to be malignant. She completed SBRT, 6/23/2017. Ms Ricci Bond is now 35 months s/p CyberKnife SBRT to the 2 lesions left lung. She tolerated SBRT with out incident. CT scan of the chest, 3/4/2020 reveals stable post treatment changes with no evidence to suggest tumor recurrence of progression. COUNSELING AND COORDINATION OF CARE: I have had a 25 minute visit with Ms Ricci Bond of which greater than half has been spent counseling her about continued management of her lung nodules. CT chest discussed with Ms Ricci Bond. We will continue with CT w/o contrast of the chest every 6 months, years 2-5.   - Ct chest w/o contrast 6 months  - I will see her shortly after Ct chest in 6 months      Vern Covington NP   March 10, 2020      Portions of this note were copied from prior encounters and reviewed for accuracy, currency, and represent documentation and tasks completed during this encounter. I verify and attest these portions to be unchanged from prior visits.

## 2020-09-10 ENCOUNTER — APPOINTMENT (OUTPATIENT)
Dept: RADIATION ONCOLOGY | Age: 85
End: 2020-09-10

## 2021-03-05 ENCOUNTER — HOSPITAL ENCOUNTER (OUTPATIENT)
Dept: CT IMAGING | Age: 86
Discharge: HOME | End: 2021-03-05
Attending: NURSE PRACTITIONER

## 2021-03-05 DIAGNOSIS — C34.90 MALIGNANT NEOPLASM OF LUNG, UNSPECIFIED LATERALITY, UNSPECIFIED PART OF LUNG (HCC): ICD-10-CM

## 2021-03-05 NOTE — PROGRESS NOTES
CT scan is overall stable with the exception of one nodule that has doubled in size and now is nearly 6 mm. Would recommend follow up CT scan in 6 months which I will arrange at upcoming appt. Message sent to patient via [address]

## 2021-09-07 ENCOUNTER — HOSPITAL ENCOUNTER (OUTPATIENT)
Dept: CT IMAGING | Age: 86
Discharge: HOME | End: 2021-09-07
Attending: NURSE PRACTITIONER

## 2021-09-07 DIAGNOSIS — R91.8 LUNG NODULES: ICD-10-CM

## 2021-12-13 ENCOUNTER — HOSPITAL ENCOUNTER (OUTPATIENT)
Dept: CT IMAGING | Age: 86
Discharge: HOME | End: 2021-12-13
Attending: NURSE PRACTITIONER

## 2021-12-13 DIAGNOSIS — R91.1 LUNG NODULE: ICD-10-CM

## 2022-03-18 PROBLEM — R91.8 LUNG NODULES: Status: ACTIVE | Noted: 2017-03-03

## 2022-03-20 PROBLEM — C34.90 LUNG CANCER (HCC): Status: ACTIVE | Noted: 2018-08-29

## 2022-06-24 RX ORDER — POTASSIUM CHLORIDE 750 MG/1
TABLET, FILM COATED, EXTENDED RELEASE ORAL
Qty: 360 TABLET | Refills: 3 | Status: SHIPPED | OUTPATIENT
Start: 2022-06-24

## 2022-06-24 NOTE — TELEPHONE ENCOUNTER
Prescription sent to pharmacy//[facility]  Requested Prescriptions     Signed Prescriptions Disp Refills    potassium chloride (KLOR-CON) 10 MEQ extended release tablet 360 tablet 3     Sig: TAKE 2 TABLETS TWICE A DAY     Authorizing Provider: Lexi Angulo     Ordering User: [facility]

## 2022-06-27 ENCOUNTER — TELEPHONE (OUTPATIENT)
Dept: PULMONOLOGY | Age: 87
End: 2022-06-27

## 2022-06-27 ENCOUNTER — HOSPITAL ENCOUNTER (OUTPATIENT)
Dept: CT IMAGING | Age: 87
Discharge: HOME | End: 2022-06-30

## 2022-06-27 DIAGNOSIS — R91.1 LUNG NODULE: ICD-10-CM

## 2022-06-27 NOTE — PROGRESS NOTES
No    Sexual activity: Not on file   Other Topics Concern    Not on file   Social History Narrative    Not on file     Social Determinants of Health     Financial Resource Strain:     Difficulty of Paying Living Expenses: Not on file   Food Insecurity:     Worried About Running Out of Food in the Last Year: Not on file    Sharona of Food in the Last Year: Not on file   Transportation Needs:     Lack of Transportation (Medical): Not on file    Lack of Transportation (Non-Medical):  Not on file   Physical Activity:     Days of Exercise per Week: Not on file    Minutes of Exercise per Session: Not on file   Stress:     Feeling of Stress : Not on file   Social Connections:     Frequency of Communication with Friends and Family: Not on file    Frequency of Social Gatherings with Friends and Family: Not on file    Attends Jew Services: Not on file    Active Member of [address] [place] [address] [employer] or Organizations: Not on file    Attends Club or Organization Meetings: Not on file    Marital Status: Not on file   Intimate Partner Violence:     Fear of Current or Ex-Partner: Not on file    Emotionally Abused: Not on file    Physically Abused: Not on file    Sexually Abused: Not on file   Housing Stability:     Unable to Pay for Housing in the Last Year: Not on file    Number of [place] in the Last Year: Not on file    Unstable Housing in the Last Year: Not on file       Patient Active Problem List   Diagnosis    Benign hypertensive heart disease without CHF    Hypo-osmolality and hyponatremia    Pulmonary nodule, left    Hx of thyroid cancer    Lung nodules    History of breast cancer    On amiodarone therapy    Hypothyroidism    Hypertension    Lung cancer ([facility] [address]    Cardiac arrhythmia    Hypomagnesemia          No Known Allergies    Current Outpatient Medications   Medication Sig    potassium chloride (KLOR-CON) 10 MEQ extended release tablet TAKE 2 TABLETS TWICE A DAY    amiodarone (CORDARONE) 200 MG tablet TAKE 1/2 TABLET DAILY    amLODIPine (NORVASC) 5 MG tablet Take 5 mg by mouth daily    vitamin D 25 MCG (1000 UT) CAPS Take by mouth daily    hydrOXYzine (ATARAX) 25 MG tablet Take by mouth every 6 hours as needed    indapamide (LOZOL) 2.5 MG tablet Take 2.5 mg by mouth 2 times daily    levothyroxine (SYNTHROID) 150 MCG tablet Take 150 mcg by mouth every morning (before breakfast)     No current facility-administered medications for this visit. Review of Systems   Constitutional: Negative for chills, diaphoresis, fatigue and fever. Cardiovascular: Negative for chest pain, palpitations and leg swelling. Gastrointestinal: Negative for abdominal pain, constipation, diarrhea, nausea and vomiting. Neurological: Negative for dizziness, tremors, seizures, syncope, weakness and headaches. PHYSICAL EXAM:    Vitals:    06/28/22 1244   BP: (!) 140/74   Pulse: 74   Resp: 18   Temp: 97 °F (36.1 °C)   TempSrc: Skin  Comment: wrist   SpO2: 96%   Weight: 136 lb (61.7 kg)   Height: 4' 11\" (1.499 m)      Body mass index is 27.47 kg/m². GENERAL APPEARANCE:  The patient is normal weight and in no respiratory distress. HEENT:  PERRL. Conjunctivae unremarkable. Nasal mucosa is without epistaxis, exudate, or polyps. Gums and dentition are unremarkable. There is no oropharyngeal narrowing. TMs are clear. NECK/LYMPHATIC:  Symmetrical with no elevation of jugular venous pulsation. Trachea midline. No thyroid enlargement. No cervical adenopathy. LUNGS:  Normal respiratory effort with symmetrical lung expansion. Breath sounds clear. HEART:  There is a regular rate and rhythm. No murmur, rub, or gallop. There is no edema in the lower extremities. ABDOMEN:  Soft and non-tender. No hepatosplenomegaly. Bowel sounds are normal.    NEURO:  The patient is alert and oriented to person, place, and time.   Memory appears intact and mood is normal.  No gross sensorimotor deficits are present. DIAGNOSTIC TESTS:   Imaging:   CT chest without contrast 06/27/2022    Narrative  EXAM: CT CHEST WITHOUT CONTRAST    INDICATION: Lung nodule follow-up. COMPARISON: CT 12/13/2021    TECHNIQUE:  CT imaging was performed of the chest without intravenous contrast.  Coronal reformatted imaging provided. Radiation dose reduction techniques were  used for this study. Our CT scanners use one or all of the following: Automated  exposure control, adjustment of the mA and/or kV according to patient size,  iterative reconstruction. FINDINGS:    CHEST:    Mediastinum and visualized thyroid: Normal.    Heart: Multivessel coronary atherosclerotic calcifications. Large Vessels: Aortic atherosclerotic calcifications without aneurysmal  dilation. Pleura: Normal.    Lungs: Multiple bilateral lung nodules are stable. The largest of these is  located posteriorly in the right middle lobe and measures 0.9 x 0.8 cm,  previously 0.9 x 0.8 cm. Some of the nodules are calcified. No interval new or  enlarging nodule. Stable postsurgical changes and scarring in the lingular. Airways: Normal.    Lymph nodes: Normal.    Bones/Soft tissues: No aggressive bone lesion. Multilevel degenerative changes  of the spine. Visualized abdomen: Normal.    Impression  Stable bilateral lung nodules, including the 0.9 x 0.8 cm nodule in the right  middle lobe which appeared to enlarge previously. Dangelo Schwarz NP, APRN - CNP  Electronically signed    Dictated using voice recognition software.   Proof read but unrecognized errors may exist.

## 2022-06-27 NOTE — TELEPHONE ENCOUNTER
I CALLED AND LEFT MESSAGE  PATIENT TO CALL US BACK I SEEN SHE HAS NOT COMPLETE HER CT SCAN THAT IS NEEDED PRIOR TO HER APPT. ....  GERARDO MARION

## 2022-06-28 ENCOUNTER — OFFICE VISIT (OUTPATIENT)
Dept: PULMONOLOGY | Age: 87
End: 2022-06-28
Payer: MEDICARE

## 2022-06-28 VITALS
BODY MASS INDEX: 27.42 KG/M2 | DIASTOLIC BLOOD PRESSURE: 74 MMHG | TEMPERATURE: 97 F | OXYGEN SATURATION: 96 % | HEIGHT: 59 IN | HEART RATE: 74 BPM | WEIGHT: 136 LBS | RESPIRATION RATE: 18 BRPM | SYSTOLIC BLOOD PRESSURE: 140 MMHG

## 2022-06-28 DIAGNOSIS — R91.8 LUNG NODULES: Primary | ICD-10-CM

## 2022-06-28 PROCEDURE — 99213 OFFICE O/P EST LOW 20 MIN: CPT | Performed by: NURSE PRACTITIONER

## 2022-06-28 PROCEDURE — G8417 CALC BMI ABV UP PARAM F/U: HCPCS | Performed by: NURSE PRACTITIONER

## 2022-06-28 PROCEDURE — 1036F TOBACCO NON-USER: CPT | Performed by: NURSE PRACTITIONER

## 2022-06-28 PROCEDURE — 1090F PRES/ABSN URINE INCON ASSESS: CPT | Performed by: NURSE PRACTITIONER

## 2022-06-28 PROCEDURE — 1123F ACP DISCUSS/DSCN MKR DOCD: CPT | Performed by: NURSE PRACTITIONER

## 2022-06-28 PROCEDURE — G8427 DOCREV CUR MEDS BY ELIG CLIN: HCPCS | Performed by: NURSE PRACTITIONER

## 2022-06-28 ASSESSMENT — ENCOUNTER SYMPTOMS
ABDOMINAL PAIN: 0
DIARRHEA: 0
CONSTIPATION: 0
VOMITING: 0
NAUSEA: 0

## 2022-09-01 ENCOUNTER — OFFICE VISIT (OUTPATIENT)
Dept: CARDIOLOGY CLINIC | Age: 87
End: 2022-09-01
Payer: MEDICARE

## 2022-09-01 VITALS
BODY MASS INDEX: 26.49 KG/M2 | SYSTOLIC BLOOD PRESSURE: 138 MMHG | WEIGHT: 131.4 LBS | HEIGHT: 59 IN | DIASTOLIC BLOOD PRESSURE: 60 MMHG | HEART RATE: 70 BPM

## 2022-09-01 DIAGNOSIS — E03.9 HYPOTHYROIDISM, UNSPECIFIED TYPE: ICD-10-CM

## 2022-09-01 DIAGNOSIS — I47.1 SUPRAVENTRICULAR TACHYCARDIA (HCC): Primary | ICD-10-CM

## 2022-09-01 DIAGNOSIS — I10 PRIMARY HYPERTENSION: ICD-10-CM

## 2022-09-01 PROCEDURE — G8417 CALC BMI ABV UP PARAM F/U: HCPCS | Performed by: INTERNAL MEDICINE

## 2022-09-01 PROCEDURE — 99213 OFFICE O/P EST LOW 20 MIN: CPT | Performed by: INTERNAL MEDICINE

## 2022-09-01 PROCEDURE — 1036F TOBACCO NON-USER: CPT | Performed by: INTERNAL MEDICINE

## 2022-09-01 PROCEDURE — 1123F ACP DISCUSS/DSCN MKR DOCD: CPT | Performed by: INTERNAL MEDICINE

## 2022-09-01 PROCEDURE — 1090F PRES/ABSN URINE INCON ASSESS: CPT | Performed by: INTERNAL MEDICINE

## 2022-09-01 PROCEDURE — G8427 DOCREV CUR MEDS BY ELIG CLIN: HCPCS | Performed by: INTERNAL MEDICINE

## 2022-09-01 ASSESSMENT — ENCOUNTER SYMPTOMS: SHORTNESS OF BREATH: 0

## 2022-09-01 NOTE — PROGRESS NOTES
[address] [place], [place]  [address], [address]  PHONE: Nathalia Cordon  7/24/1925      SUBJECTIVE:   [id number] Sr Carter is a 80 y.o. female seen for a follow up visit regarding the following:     Chief Complaint   Patient presents with    Hypertension       HPI:    Patient presents for follow-up. She has been stable from a cardiac perspective. Fortunately, she has had no significant cardiac arrhythmias. She is tolerating amiodarone without side effects. Her thyroid function has been normal and is followed by endocrinology. Blood pressure well controlled with Amlodipine. Past Medical History, Past Surgical History, Family history, Social History, and Medications were all reviewed with the patient today and updated as necessary. Current Outpatient Medications:     potassium chloride (KLOR-CON) 10 MEQ extended release tablet, TAKE 2 TABLETS TWICE A DAY, Disp: 360 tablet, Rfl: 3    amiodarone (CORDARONE) 200 MG tablet, TAKE 1/2 TABLET DAILY, Disp: , Rfl:     amLODIPine (NORVASC) 5 MG tablet, Take 5 mg by mouth daily, Disp: , Rfl:     vitamin D 25 MCG (1000 UT) CAPS, Take by mouth daily, Disp: , Rfl:     hydrOXYzine (ATARAX) 25 MG tablet, Take by mouth every 6 hours as needed, Disp: , Rfl:     indapamide (LOZOL) 2.5 MG tablet, Take 2.5 mg by mouth 2 times daily, Disp: , Rfl:     levothyroxine (SYNTHROID) 150 MCG tablet, Take 150 mcg by mouth every morning (before breakfast), Disp: , Rfl:      No Known Allergies     Patient Active Problem List    Diagnosis Date Noted    [facility] 08/29/2018     Priority: Low    Lung nodules 03/03/2017     Priority: Low    On amiodarone therapy 08/04/2016     Priority: Low    Benign hypertensive heart disease without CHF 05/24/2016     Priority: Low    Hypo-osmolality and hyponatremia 05/24/2016     Priority: Low    Hypothyroidism 05/24/2016     Priority: Low     Surgically removed.         Supraventricular tachycardia ([address] 05/24/2016     Priority: Low     1. 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 Low mg and K. Started on amiodarone. a. Echo: Normal LV function. EF 55-60%. Grade I diastolic dysfunction. Mild LAE. Hypomagnesemia 05/24/2016     Priority: Low    Hx of thyroid cancer 05/09/2014     Priority: Low     S/p thyroidectomy        History of breast cancer 05/09/2014     Priority: Low    Hypertension 05/09/2014     Priority: Low    Pulmonary nodule, left 05/15/2013     Priority: Low     Left lower lobe spheres segment. A 7 mm on [facility] scan   December 2012. 1.8 S UV on PET scan. 6 mm on follow up CT May 2013. Social History     Tobacco Use    Smoking status: Never    Smokeless tobacco: Never   Substance Use Topics    Alcohol use: No     Alcohol/week: 0.0 standard drinks       ROS:    Review of Systems   Constitutional: Negative for malaise/fatigue. Cardiovascular:  Negative for chest pain. Respiratory:  Negative for shortness of breath. Musculoskeletal:  Positive for arthritis. Neurological:  Negative for focal weakness. Psychiatric/Behavioral:  Negative for depression. PHYSICAL EXAM:  Wt Readings from Last 3 Encounters:   09/01/22 131 lb 6.4 oz (59.6 kg)   06/28/22 136 lb (61.7 kg)   02/28/22 140 lb (63.5 kg)     BP Readings from Last 3 Encounters:   09/01/22 138/60   06/28/22 (!) 140/74   02/28/22 (!) 120/50     Pulse Readings from Last 3 Encounters:   09/01/22 70   06/28/22 74   02/28/22 66       Physical Exam  Constitutional:       General: She is not in acute distress. Appearance: Normal appearance. Neck:      Vascular: No carotid bruit. Cardiovascular:      Rate and Rhythm: Normal rate and regular rhythm. Heart sounds: Murmur (Grade I/VI MARÍA) heard. Pulmonary:      Breath sounds: Normal breath sounds. No wheezing. Abdominal:      General: There is no distension.       Palpations: Abdomen is soft. Musculoskeletal:         General: Swelling (trivial edema) present. Skin:     General: Skin is warm and dry. Neurological:      General: No focal deficit present. Psychiatric:         Mood and Affect: Mood normal.       Medical problems and test results were reviewed with the patient today. No results found for: WBC, HGB, HCT, MCV, PLT    No results found for: NA, K, CL, CO2, BUN, CREATININE, GLUCOSE, CALCIUM     No results found for: CHOL  No results found for: TRIG  No results found for: HDL  No results found for: LDLCHOLESTEROL, LDLCALC  No results found for: LABVLDL, VLDL  No results found for: CHOLHDLRATIO     Data from outside records/labs from outside providers have been reviewed and summarized as noted in the HPI, past history and data review sections of this note       ASSESSMENT and PLAN      1. Supraventricular tachycardia (HCC)  No recurrent arrhythmias on amiodarone. Continue amiodarone 100 mg a day. 2. Primary hypertension  Blood pressure currently well controlled. Continue amlodipine. 3. Hypothyroidism, unspecified type  Continue replacement therapy. Levels followed by endocrinology. Lizzie Prabhakar MD  9/1/2022  10:15 AM    This note may have been dictated using speech recognition software.   As a result, error of speech recognition may have occurred

## 2022-10-20 ENCOUNTER — TELEPHONE (OUTPATIENT)
Dept: CARDIOLOGY CLINIC | Age: 87
End: 2022-10-20

## 2022-10-20 RX ORDER — AMIODARONE HYDROCHLORIDE 200 MG/1
100 TABLET ORAL DAILY
Qty: 45 TABLET | Refills: 3 | Status: SHIPPED | OUTPATIENT
Start: 2022-10-20

## 2022-10-20 NOTE — TELEPHONE ENCOUNTER
Prescription sent to pharmacy//brendab  Requested Prescriptions     Signed Prescriptions Disp Refills    amiodarone (CORDARONE) 200 MG tablet 45 tablet 3     Sig: Take 0.5 tablets by mouth daily TAKE 1/2 TABLET DAILY     Authorizing Provider: Allen Baeza     Ordering User: Hattie Martin [facility]/Medications/Labs

## 2022-10-20 NOTE — TELEPHONE ENCOUNTER
MEDICATION REFILL REQUEST      Name of Medication:  amiodarone  Dose:  200 mg  Frequency:  1/2 tab once a day  Quantity:  45   Days' supply: 3 months      Pharmacy Name/Location:  [facility] [phone]

## 2022-11-21 ENCOUNTER — TELEPHONE (OUTPATIENT)
Dept: CARDIOLOGY CLINIC | Age: 87
End: 2022-11-21

## 2022-11-21 NOTE — TELEPHONE ENCOUNTER
Interaction between Advil and amiodarone. However, at her age [de-identified] can cause problems with her kidneys. Would recommend that she see her PCP to determine the etiology of her pain. If they feel Advil is the best course of therapy, no contraindication from a cardiac standpoint. Austin Leonard called with Dr. Derrick Patel response.  Austin Leonard voiced understanding//sharriab

## 2022-11-21 NOTE — TELEPHONE ENCOUNTER
Andi Mir, daughter, called stating patient is having left side pain below her waist at the top of her hip. Very painful when she moves or walks, does not bother her when she sits. Tylenol is not helping. Can patient take Advil to help with the pain? Has not tried anything topical to help relieve pain. I suggested that she try ice alternating with cold compresses, biofreeze, aspercreme or  Voltaren gel. Will ask about taking Advil with amiodarone. Andi Mir, daughter, voiced understanding.

## 2022-11-21 NOTE — TELEPHONE ENCOUNTER
Daughter-Yohannes has called and said her mother is having back pain due to patient's age not heart. No chest pain, No shortness of breath per daughter. Patient has been taking Tylenol for the pain but it is not working very well. At one time Yohannes said Dr. Davi Ritter said she could take a little Aleve but they can't remember how much.  Best call back is [phone].

## 2023-02-20 ENCOUNTER — OFFICE VISIT (OUTPATIENT)
Dept: CARDIOLOGY CLINIC | Age: 88
End: 2023-02-20
Payer: MEDICARE

## 2023-02-20 VITALS
HEIGHT: 59 IN | SYSTOLIC BLOOD PRESSURE: 132 MMHG | WEIGHT: 134 LBS | HEART RATE: 66 BPM | BODY MASS INDEX: 27.01 KG/M2 | DIASTOLIC BLOOD PRESSURE: 50 MMHG

## 2023-02-20 DIAGNOSIS — I10 HYPERTENSION: ICD-10-CM

## 2023-02-20 DIAGNOSIS — Z79.899 ON AMIODARONE THERAPY: ICD-10-CM

## 2023-02-20 DIAGNOSIS — E87.6 HYPOKALEMIA: ICD-10-CM

## 2023-02-20 DIAGNOSIS — R01.1 MURMUR: ICD-10-CM

## 2023-02-20 DIAGNOSIS — I47.1 SUPRAVENTRICULAR TACHYCARDIA (HCC): ICD-10-CM

## 2023-02-20 DIAGNOSIS — I10 PRIMARY HYPERTENSION: Primary | ICD-10-CM

## 2023-02-20 LAB
ALBUMIN SERPL-MCNC: 3.8 G/DL (ref 3.2–4.6)
ALBUMIN/GLOB SERPL: 0.9 (ref 0.4–1.6)
ALP SERPL-CCNC: 89 U/L (ref 50–136)
ALT SERPL-CCNC: 23 U/L (ref 12–65)
ANION GAP SERPL CALC-SCNC: 2 MMOL/L (ref 2–11)
AST SERPL-CCNC: 22 U/L (ref 15–37)
BILIRUB SERPL-MCNC: 0.6 MG/DL (ref 0.2–1.1)
BUN SERPL-MCNC: 27 MG/DL (ref 8–23)
CALCIUM SERPL-MCNC: 9.7 MG/DL (ref 8.3–10.4)
CHLORIDE SERPL-SCNC: 105 MMOL/L (ref 101–110)
CO2 SERPL-SCNC: 30 MMOL/L (ref 21–32)
CREAT SERPL-MCNC: 1 MG/DL (ref 0.6–1)
GLOBULIN SER CALC-MCNC: 4.4 G/DL (ref 2.8–4.5)
GLUCOSE SERPL-MCNC: 102 MG/DL (ref 65–100)
POTASSIUM SERPL-SCNC: 3.8 MMOL/L (ref 3.5–5.1)
PROT SERPL-MCNC: 8.2 G/DL (ref 6.3–8.2)
SODIUM SERPL-SCNC: 137 MMOL/L (ref 133–143)

## 2023-02-20 PROCEDURE — 1090F PRES/ABSN URINE INCON ASSESS: CPT | Performed by: INTERNAL MEDICINE

## 2023-02-20 PROCEDURE — G8427 DOCREV CUR MEDS BY ELIG CLIN: HCPCS | Performed by: INTERNAL MEDICINE

## 2023-02-20 PROCEDURE — 99214 OFFICE O/P EST MOD 30 MIN: CPT | Performed by: INTERNAL MEDICINE

## 2023-02-20 PROCEDURE — G8417 CALC BMI ABV UP PARAM F/U: HCPCS | Performed by: INTERNAL MEDICINE

## 2023-02-20 PROCEDURE — G8484 FLU IMMUNIZE NO ADMIN: HCPCS | Performed by: INTERNAL MEDICINE

## 2023-02-20 PROCEDURE — 1123F ACP DISCUSS/DSCN MKR DOCD: CPT | Performed by: INTERNAL MEDICINE

## 2023-02-20 PROCEDURE — 1036F TOBACCO NON-USER: CPT | Performed by: INTERNAL MEDICINE

## 2023-02-20 PROCEDURE — 93000 ELECTROCARDIOGRAM COMPLETE: CPT | Performed by: INTERNAL MEDICINE

## 2023-02-20 RX ORDER — POTASSIUM CHLORIDE 750 MG/1
TABLET, FILM COATED, EXTENDED RELEASE ORAL
Qty: 360 TABLET | Refills: 3
Start: 2023-02-20

## 2023-02-20 RX ORDER — LOPERAMIDE HYDROCHLORIDE 2 MG/1
2 CAPSULE ORAL 4 TIMES DAILY PRN
COMMUNITY

## 2023-02-20 ASSESSMENT — ENCOUNTER SYMPTOMS: SHORTNESS OF BREATH: 0

## 2023-02-20 NOTE — PROGRESS NOTES
[address] [place], [place]  [address], [address], [address] [place] [address]  PHONE: Nathalia Appiah 98  7/24/1925      SUBJECTIVE:   [id number] Sr Carter is a 80 y.o. female seen for a follow up visit regarding the following:     Chief Complaint   Patient presents with    Hypertension       HPI:    Patient presents for follow-up. She has done well since her last visit without any palpitation or tachycardia. Remains on low-dose amiodarone. Has not had labs in regards to her hypokalemia since August.  At that time her potassium was 3.9. Recently seen by her endocrinologist.  Her free T4 level was elevated and TSH was low. Plans to decrease thyroid replacement therapy. She was told by her PCP that she has a cardiac murmur. Has not had recent echocardiogram but denies any dyspnea. On exam today appears that she has aortic murmur likely consistent with mild aortic stenosis. Past Medical History, Past Surgical History, Family history, Social History, and Medications were all reviewed with the patient today and updated as necessary.            Current Outpatient Medications:     loperamide (IMODIUM) 2 MG capsule, Take 2 mg by mouth 4 times daily as needed for Diarrhea, Disp: , Rfl:     potassium chloride (KLOR-CON) 10 MEQ extended release tablet, TAKE 2 TABLETS TWICE A DAY, Disp: 360 tablet, Rfl: 3    amiodarone (CORDARONE) 200 MG tablet, Take 0.5 tablets by mouth daily TAKE 1/2 TABLET DAILY, Disp: 45 tablet, Rfl: 3    amLODIPine (NORVASC) 5 MG tablet, Take 5 mg by mouth daily, Disp: , Rfl:     vitamin D 25 MCG (1000 UT) CAPS, Take by mouth daily, Disp: , Rfl:     hydrOXYzine (ATARAX) 25 MG tablet, Take by mouth every 6 hours as needed, Disp: , Rfl:     indapamide (LOZOL) 2.5 MG tablet, Take 2.5 mg by mouth 2 times daily, Disp: , Rfl:     levothyroxine (SYNTHROID) 150 MCG tablet, Take 150 mcg by mouth every morning (before breakfast), Disp: , Rfl:      No Known Allergies     Patient Active Problem List    Diagnosis Date Noted    Hypokalemia 02/20/2023     Priority: High    Lung cancer ([facility] 08/29/2018     Priority: Low    Lung nodules 03/03/2017     Priority: Low    On amiodarone therapy 08/04/2016     Priority: Low    Benign hypertensive heart disease without CHF 05/24/2016     Priority: Low    Hypo-osmolality and hyponatremia 05/24/2016     Priority: Low    Hypothyroidism 05/24/2016     Priority: Low     Surgically removed. Supraventricular tachycardia ([facility] 05/24/2016     Priority: Low     1. 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 Low mg and K. Started on amiodarone. a. Echo: Normal LV function. EF 55-60%. Grade I diastolic dysfunction. Mild LAE. Hypomagnesemia 05/24/2016     Priority: Low    Hx of thyroid cancer 05/09/2014     Priority: Low     S/p thyroidectomy        History of breast cancer 05/09/2014     Priority: Low    Hypertension 05/09/2014     Priority: Low    Pulmonary nodule, left 05/15/2013     Priority: Low     Left lower lobe spheres segment. A 7 mm on [facility] scan   December 2012. 1.8 S UV on PET scan. 6 mm on follow up CT May 2013. Social History     Tobacco Use    Smoking status: Never    Smokeless tobacco: Never   Substance Use Topics    Alcohol use: No     Alcohol/week: 0.0 standard drinks       ROS:    Review of Systems   Constitutional: Positive for malaise/fatigue. Cardiovascular:  Negative for chest pain and irregular heartbeat. Respiratory:  Negative for shortness of breath. Musculoskeletal:  Positive for arthritis. Neurological:  Negative for focal weakness. Psychiatric/Behavioral:  Negative for depression.           PHYSICAL EXAM:  Wt Readings from Last 3 Encounters:   02/20/23 134 lb (60.8 kg)   09/01/22 131 lb 6.4 oz (59.6 kg)   06/28/22 136 lb (61.7 kg)     BP Readings from Last 3 Encounters:   02/20/23 (!) 132/50   09/01/22 138/60   06/28/22 (!) 140/74     Pulse Readings from Last 3 Encounters:   02/20/23 66   09/01/22 70   06/28/22 74       Physical Exam  Constitutional:       General: She is not in acute distress.     Appearance: Normal appearance.   HENT:      Mouth/Throat:      Mouth: Mucous membranes are moist.   Neck:      Vascular: No carotid bruit.   Cardiovascular:      Rate and Rhythm: Normal rate and regular rhythm.      Heart sounds: Murmur (Grade II/VI MARÍA) heard.   Pulmonary:      Breath sounds: Normal breath sounds. No wheezing.   Abdominal:      General: There is no distension.      Palpations: Abdomen is soft.   Musculoskeletal:         General: No swelling.   Skin:     General: Skin is warm and dry.   Neurological:      General: No focal deficit present.   Psychiatric:         Mood and Affect: Mood normal.       Medical problems and test results were reviewed with the patient today.       No results found for: WBC, HGB, HCT, MCV, PLT    No results found for: NA, K, CL, CO2, BUN, CREATININE, GLUCOSE, CALCIUM     No results found for: CHOL  No results found for: TRIG  No results found for: HDL  No results found for: LDLCHOLESTEROL, LDLCALC  No results found for: LABVLDL, VLDL  No results found for: CHOLHDLRATIO     Data from outside records/labs from outside providers have been reviewed and summarized as noted in the HPI, past history and data review sections of this note     EKG done today and reviewed with patient showed:  Sinus  Rhythm   Low voltage in precordial leads.    -Poor R-wave progression  Rate 66      ASSESSMENT and PLAN      1. Hypertension  Blood pressure appears well controlled.  Continue amlodipine and indapamide.  - EKG 12 Lead  - Comprehensive Metabolic Panel; Future    2. Hypokalemia  Check labs today including complete metabolic panel.  - Comprehensive Metabolic Panel; Future    3. On amiodarone therapy  Continue amiodarone.  Adjust thyroid medications per endocrinology.  - Comprehensive Metabolic Panel; Future    4. Supraventricular tachycardia  ([address]  No recurrent arrhythmias with low-dose amiodarone. We will continue. 5. Murmur  Aortic murmur likely aortic valve sclerosis versus mild aortic stenosis. Given her age and comorbidities no indication for further testing at the present time. Return in about 6 months (around 8/20/2023). Supriya Saez MD  2/20/2023  3:08 PM    This note may have been dictated using speech recognition software.   As a result, error of speech recognition may have occurred

## 2023-02-21 ENCOUNTER — TELEPHONE (OUTPATIENT)
Dept: CARDIOLOGY CLINIC | Age: 88
End: 2023-02-21

## 2023-02-21 NOTE — TELEPHONE ENCOUNTER
Daughter, EC called with results, voiced understanding//kelin----- Message from Moraima Flood MD sent at 2/21/2023  3:58 PM EST -----  Please call patient. Labs are reviewed and are acceptable. Continue current medications. Call with any questions, concerns or new/worsening cardiac symptoms.

## 2023-08-12 ASSESSMENT — ENCOUNTER SYMPTOMS: SHORTNESS OF BREATH: 0

## 2023-08-12 NOTE — PROGRESS NOTES
[address] [place]  [address] [facility], [facility], [address] [place] [address]  PHONE: 400 [facility]  7/24/1925      SUBJECTIVE:   [address] is a 80 y.o. female seen for a follow up visit regarding the following:     Chief Complaint   Patient presents with    Irregular Heart Beat       HPI:  Patient presents for follow-up. Last visit in February. Multiple issues addressed. PE: No palpitations or tachycardia. On amiodarone. HTN:  BP controlled. Hypokalemia: Last K was 3.9 on 3/6/23. Remain on 10 meQ of KCL. Hypothyroidism:  Had prior thyroidectomy due to thyroid cancer. On thyroid replacement. Follows with endocrinology. Last TSH 6/27/23 was 0.818. Amiodarone use:  Remains on low dose amiodarone. Endocrine follows thyroid panel. Still does some gardening. Past Medical History, Past Surgical History, Family history, Social History, and Medications were all reviewed with the patient today and updated as necessary.            Current Outpatient Medications:     potassium chloride (KLOR-CON) 10 MEQ extended release tablet, TAKE 2 TABLETS TWICE A DAY, Disp: 360 tablet, Rfl: 3    loperamide (IMODIUM) 2 MG capsule, Take 1 capsule by mouth 4 times daily as needed for Diarrhea, Disp: , Rfl:     amiodarone (CORDARONE) 200 MG tablet, Take 0.5 tablets by mouth daily TAKE 1/2 TABLET DAILY, Disp: 45 tablet, Rfl: 3    amLODIPine (NORVASC) 5 MG tablet, Take 1 tablet by mouth daily, Disp: , Rfl:     vitamin D 25 MCG (1000 UT) CAPS, Take by mouth daily, Disp: , Rfl:     hydrOXYzine (ATARAX) 25 MG tablet, Take by mouth every 6 hours as needed, Disp: , Rfl:     indapamide (LOZOL) 2.5 MG tablet, Take 1 tablet by mouth 2 times daily, Disp: , Rfl:     levothyroxine (SYNTHROID) 137 MCG tablet, Take 1 tablet by mouth every morning (before breakfast), Disp: , Rfl:      No Known Allergies     Patient Active Problem List    Diagnosis Date Noted    Hypokalemia 02/20/2023     Priority:
Vaginal

## 2023-08-14 ENCOUNTER — OFFICE VISIT (OUTPATIENT)
Age: 88
End: 2023-08-14
Payer: MEDICARE

## 2023-08-14 VITALS
HEART RATE: 62 BPM | DIASTOLIC BLOOD PRESSURE: 50 MMHG | WEIGHT: 134.8 LBS | BODY MASS INDEX: 27.17 KG/M2 | HEIGHT: 59 IN | SYSTOLIC BLOOD PRESSURE: 132 MMHG

## 2023-08-14 DIAGNOSIS — I10 PRIMARY HYPERTENSION: ICD-10-CM

## 2023-08-14 DIAGNOSIS — I47.1 SUPRAVENTRICULAR TACHYCARDIA (HCC): Primary | ICD-10-CM

## 2023-08-14 DIAGNOSIS — E03.9 HYPOTHYROIDISM, UNSPECIFIED TYPE: ICD-10-CM

## 2023-08-14 DIAGNOSIS — E87.6 HYPOKALEMIA: ICD-10-CM

## 2023-08-14 PROCEDURE — 1036F TOBACCO NON-USER: CPT | Performed by: INTERNAL MEDICINE

## 2023-08-14 PROCEDURE — 99214 OFFICE O/P EST MOD 30 MIN: CPT | Performed by: INTERNAL MEDICINE

## 2023-08-14 PROCEDURE — 1090F PRES/ABSN URINE INCON ASSESS: CPT | Performed by: INTERNAL MEDICINE

## 2023-08-14 PROCEDURE — 1123F ACP DISCUSS/DSCN MKR DOCD: CPT | Performed by: INTERNAL MEDICINE

## 2023-08-14 PROCEDURE — G8417 CALC BMI ABV UP PARAM F/U: HCPCS | Performed by: INTERNAL MEDICINE

## 2023-08-14 PROCEDURE — G8427 DOCREV CUR MEDS BY ELIG CLIN: HCPCS | Performed by: INTERNAL MEDICINE

## 2024-01-01 RX ORDER — AMIODARONE HYDROCHLORIDE 200 MG/1
100 TABLET ORAL DAILY
Qty: 45 TABLET | Refills: 3 | Status: SHIPPED | OUTPATIENT
Start: 2024-01-01

## 2024-01-22 ENCOUNTER — TELEPHONE (OUTPATIENT)
Age: 89
End: 2024-01-22

## 2024-01-22 RX ORDER — AMIODARONE HYDROCHLORIDE 200 MG/1
100 TABLET ORAL DAILY
Qty: 45 TABLET | Refills: 3 | Status: SHIPPED | OUTPATIENT
Start: 2024-01-22

## 2024-01-22 NOTE — TELEPHONE ENCOUNTER
Prescription sent to [facility]//kelin  Requested Prescriptions     Signed Prescriptions Disp Refills    amiodarone (CORDARONE) 200 MG tablet 45 tablet 3     Sig: Take 0.5 tablets by mouth daily     Authorizing Provider: CHAD LUNDBERG     Ordering User: JENNIFFER CRUMP

## 2024-01-22 NOTE — TELEPHONE ENCOUNTER
Pt daughter came by [employer] office and scanned in new rx card and has a new mail in pharmacy which is [facility]  FAX:[phone], she states pt is almost out of AMIODARONE  MG these are taken by half and please send this [facility]

## 2024-02-12 ENCOUNTER — OFFICE VISIT (OUTPATIENT)
Age: 89
End: 2024-02-12
Payer: MEDICARE

## 2024-02-12 VITALS
SYSTOLIC BLOOD PRESSURE: 142 MMHG | HEIGHT: 59 IN | HEART RATE: 63 BPM | BODY MASS INDEX: 27.26 KG/M2 | DIASTOLIC BLOOD PRESSURE: 60 MMHG | WEIGHT: 135.2 LBS

## 2024-02-12 DIAGNOSIS — R01.1 MURMUR: ICD-10-CM

## 2024-02-12 DIAGNOSIS — I47.10 SUPRAVENTRICULAR TACHYCARDIA: Primary | ICD-10-CM

## 2024-02-12 DIAGNOSIS — I10 PRIMARY HYPERTENSION: ICD-10-CM

## 2024-02-12 DIAGNOSIS — Z79.899 ON AMIODARONE THERAPY: ICD-10-CM

## 2024-02-12 DIAGNOSIS — E03.9 HYPOTHYROIDISM, UNSPECIFIED TYPE: ICD-10-CM

## 2024-02-12 PROCEDURE — 1090F PRES/ABSN URINE INCON ASSESS: CPT | Performed by: INTERNAL MEDICINE

## 2024-02-12 PROCEDURE — 93000 ELECTROCARDIOGRAM COMPLETE: CPT | Performed by: INTERNAL MEDICINE

## 2024-02-12 PROCEDURE — 1036F TOBACCO NON-USER: CPT | Performed by: INTERNAL MEDICINE

## 2024-02-12 PROCEDURE — G8428 CUR MEDS NOT DOCUMENT: HCPCS | Performed by: INTERNAL MEDICINE

## 2024-02-12 PROCEDURE — 1123F ACP DISCUSS/DSCN MKR DOCD: CPT | Performed by: INTERNAL MEDICINE

## 2024-02-12 PROCEDURE — G8484 FLU IMMUNIZE NO ADMIN: HCPCS | Performed by: INTERNAL MEDICINE

## 2024-02-12 PROCEDURE — 99214 OFFICE O/P EST MOD 30 MIN: CPT | Performed by: INTERNAL MEDICINE

## 2024-02-12 PROCEDURE — G8417 CALC BMI ABV UP PARAM F/U: HCPCS | Performed by: INTERNAL MEDICINE

## 2024-02-12 RX ORDER — ACETAMINOPHEN 500 MG
1000 TABLET ORAL DAILY
COMMUNITY

## 2024-02-12 NOTE — PROGRESS NOTES
She is not in acute distress.     Appearance: Normal appearance.   HENT:      Mouth/Throat:      Mouth: Mucous membranes are moist.   Neck:      Vascular: No carotid bruit.   Cardiovascular:      Rate and Rhythm: Normal rate and regular rhythm.      Heart sounds: Murmur (Grade II/VI MARÍA) heard.   Pulmonary:      Breath sounds: Normal breath sounds. No wheezing.   Abdominal:      General: There is no distension.      Palpations: Abdomen is soft.   Musculoskeletal:         General: No swelling.   Skin:     General: Skin is warm and dry.   Neurological:      General: No focal deficit present.   Psychiatric:         Mood and Affect: Mood normal.         Medical problems and test results were reviewed with the patient today.       No results found for: \"WBC\", \"HGB\", \"HCT\", \"MCV\", \"PLT\"    Lab Results   Component Value Date/Time     02/20/2023 03:13 PM    K 3.8 02/20/2023 03:13 PM     02/20/2023 03:13 PM    CO2 30 02/20/2023 03:13 PM    BUN 27 02/20/2023 03:13 PM    CREATININE 1.00 02/20/2023 03:13 PM    GLUCOSE 102 02/20/2023 03:13 PM    CALCIUM 9.7 02/20/2023 03:13 PM        No results found for: \"CHOL\"  No results found for: \"TRIG\"  No results found for: \"HDL\"  No results found for: \"LDLCHOLESTEROL\", \"LDLCALC\"  No results found for: \"VLDL\"  No results found for: \"CHOLHDLRATIO\"     Data from outside records/labs from outside providers have been reviewed and summarized as noted in the HPI, past history and data review sections of this note     EKG done today and reviewed with patient showed:  Sinus Rhythm   -Possible anteroseptal infarct.  -Nonspecific T-abnormality.  Rate 63      ASSESSMENT and PLAN      1. Supraventricular tachycardia  She has done very well on low-dose amiodarone.  Will continue.  Call with any symptoms.  - EKG 12 Lead - Clinic Performed    2. Primary hypertension  Blood pressure mildly elevated in office today but has been controlled.  Given her age would not proceed with aggressive blood

## 2024-04-18 RX ORDER — POTASSIUM CHLORIDE 750 MG/1
TABLET, FILM COATED, EXTENDED RELEASE ORAL
Qty: 360 TABLET | Refills: 3 | Status: SHIPPED | OUTPATIENT
Start: 2024-04-18

## 2024-04-18 NOTE — TELEPHONE ENCOUNTER
[facility]kelin  Requested Prescriptions     Signed Prescriptions Disp Refills    potassium chloride (KLOR-CON) 10 MEQ extended release tablet 360 tablet 3     Sig: TAKE 2 TABLETS TWICE A DAY     Authorizing Provider: CHAD LUNDBERG     Ordering User: JENNIFFER CRUMP

## 2024-08-12 ENCOUNTER — TELEPHONE (OUTPATIENT)
Age: 89
End: 2024-08-12

## 2024-08-12 ENCOUNTER — OFFICE VISIT (OUTPATIENT)
Age: 89
End: 2024-08-12
Payer: MEDICARE

## 2024-08-12 VITALS
DIASTOLIC BLOOD PRESSURE: 70 MMHG | HEIGHT: 59 IN | BODY MASS INDEX: 27.21 KG/M2 | WEIGHT: 135 LBS | HEART RATE: 76 BPM | SYSTOLIC BLOOD PRESSURE: 124 MMHG

## 2024-08-12 DIAGNOSIS — Z79.899 ON AMIODARONE THERAPY: ICD-10-CM

## 2024-08-12 DIAGNOSIS — R60.0 LOCALIZED EDEMA: ICD-10-CM

## 2024-08-12 DIAGNOSIS — I47.10 SUPRAVENTRICULAR TACHYCARDIA (HCC): Primary | ICD-10-CM

## 2024-08-12 DIAGNOSIS — I10 PRIMARY HYPERTENSION: ICD-10-CM

## 2024-08-12 PROCEDURE — 99214 OFFICE O/P EST MOD 30 MIN: CPT | Performed by: INTERNAL MEDICINE

## 2024-08-12 PROCEDURE — 1123F ACP DISCUSS/DSCN MKR DOCD: CPT | Performed by: INTERNAL MEDICINE

## 2024-08-12 PROCEDURE — 1090F PRES/ABSN URINE INCON ASSESS: CPT | Performed by: INTERNAL MEDICINE

## 2024-08-12 PROCEDURE — G8428 CUR MEDS NOT DOCUMENT: HCPCS | Performed by: INTERNAL MEDICINE

## 2024-08-12 PROCEDURE — 1036F TOBACCO NON-USER: CPT | Performed by: INTERNAL MEDICINE

## 2024-08-12 PROCEDURE — G8417 CALC BMI ABV UP PARAM F/U: HCPCS | Performed by: INTERNAL MEDICINE

## 2024-08-12 RX ORDER — ALENDRONATE SODIUM 70 MG/1
TABLET ORAL
COMMUNITY
Start: 2024-07-23

## 2024-08-12 RX ORDER — FERROUS SULFATE 325(65) MG
1 TABLET ORAL DAILY
COMMUNITY
Start: 2024-05-20

## 2024-08-12 RX ORDER — TORSEMIDE 20 MG/1
20 TABLET ORAL DAILY
Qty: 90 TABLET | Refills: 3 | Status: SHIPPED | OUTPATIENT
Start: 2024-08-12

## 2024-08-12 ASSESSMENT — ENCOUNTER SYMPTOMS: SHORTNESS OF BREATH: 0

## 2024-08-12 NOTE — TELEPHONE ENCOUNTER
Daughter called stating that she wanted to confirm appointments. Daughter states that she thought that patient was to come back in 2 weeks. Daughter informed that per notes, she is to have labs checked in 2 weeks and follow up after her echo. Daughter states that follow up appointment is in October. I looked at the schedule and offered an appointment for 9-26 at 815 am. Daughter states that patient cannot come that early. Will keep scheduled appointment. Daughter thanked me//kelin

## 2024-08-12 NOTE — TELEPHONE ENCOUNTER
Patients daughter called stating she needs the following :    Next appt clarification  OV was 8/12  Pt mitra to have follow up labs  Next mitra appt is 10/28  Question : Is the patient to return after labs in 2 weeks or maintain the 10/28 as scheduled?    Please call and advise.

## 2024-08-12 NOTE — PROGRESS NOTES
[facility], [address], [address]  [place] [address]  PHONE: [phone]    Livier Chavez  7/24/1925      SUBJECTIVE:   Livier Chavez is a 89 y.o. female seen for a follow up visit regarding the following:     Chief Complaint   Patient presents with    6 Month Follow-Up    Irregular Heart Beat    Hypertension       HPI:    Patient presents for follow-up.  Last seen in February.  Multiple issues addressed.    SVT: No recurrence on low-dose amiodarone.  Denies any dizziness, lightheadedness, palpitations or tachycardia.    Hypertension: Patient's blood pressure has been well-controlled.    Amiodarone use: Thyroid function followed by endocrine.  LFTs have been normal.    Hypothyroidism: Followed by endocrine.  On appropriate thyroid replacement.  Last TSH in February was 1.6.    Edema: Worsening.      Past Medical History, Past Surgical History, Family history, Social History, and Medications were all reviewed with the patient today and updated as necessary.           Current Outpatient Medications:     alendronate (FOSAMAX) 70 MG tablet, , Disp: , Rfl:     ferrous sulfate (IRON 325) 325 (65 Fe) MG tablet, Take 1 tablet by mouth daily, Disp: , Rfl:     torsemide (DEMADEX) 20 MG tablet, Take 1 tablet by mouth daily, Disp: 90 tablet, Rfl: 3    potassium chloride (KLOR-CON) 10 MEQ extended release tablet, TAKE 2 TABLETS TWICE A DAY, Disp: 360 tablet, Rfl: 3    acetaminophen (TYLENOL) 500 MG tablet, Take 2 tablets by mouth daily, Disp: , Rfl:     amiodarone (CORDARONE) 200 MG tablet, Take 0.5 tablets by mouth daily, Disp: 45 tablet, Rfl: 3    loperamide (IMODIUM) 2 MG capsule, Take 1 capsule by mouth 4 times daily as needed for Diarrhea, Disp: , Rfl:     amLODIPine (NORVASC) 5 MG tablet, Take 1 tablet by mouth daily, Disp: , Rfl:     vitamin D 25 MCG (1000 UT) CAPS, Take by mouth daily, Disp: , Rfl:     hydrOXYzine (ATARAX) 25 MG tablet, Take by mouth every 6 hours as needed, Disp: , Rfl:

## 2024-08-20 ENCOUNTER — TELEPHONE (OUTPATIENT)
Age: 89
End: 2024-08-20

## 2024-08-20 DIAGNOSIS — R60.0 LOCALIZED EDEMA: ICD-10-CM

## 2024-08-20 RX ORDER — TORSEMIDE 20 MG/1
20 TABLET ORAL DAILY
Qty: 90 TABLET | Refills: 3 | Status: SHIPPED | OUTPATIENT
Start: 2024-08-20

## 2024-08-20 NOTE — TELEPHONE ENCOUNTER
Patient daughter called, stating that they did not receive the presciption for torsemide. [facility]. Daughter states that it needed to go to [facility]. Prefers that it goes to mail order as they have problems if prescriptions go to local pharmacy. New prescription sent to [facility]//kelin  Requested Prescriptions     Signed Prescriptions Disp Refills    torsemide (DEMADEX) 20 MG tablet 90 tablet 3     Sig: Take 1 tablet by mouth daily     Authorizing Provider: CHAD LUNDBERG     Ordering User: JENNIFFER CRUMP

## 2024-08-20 NOTE — TELEPHONE ENCOUNTER
Stopping Endapamide?? And starting torsemide She did not get the Rx [facility] but if she needs [facility] quick then send to [facility] and [facility]   Please call

## 2024-09-09 DIAGNOSIS — R60.0 LOCALIZED EDEMA: ICD-10-CM

## 2024-09-09 DIAGNOSIS — I47.10 SUPRAVENTRICULAR TACHYCARDIA (HCC): ICD-10-CM

## 2024-09-09 LAB
ANION GAP SERPL CALC-SCNC: 12 MMOL/L (ref 9–18)
BUN SERPL-MCNC: 42 MG/DL (ref 8–23)
CALCIUM SERPL-MCNC: 9.4 MG/DL (ref 8.8–10.2)
CHLORIDE SERPL-SCNC: 100 MMOL/L (ref 98–107)
CO2 SERPL-SCNC: 30 MMOL/L (ref 20–28)
CREAT SERPL-MCNC: 1.21 MG/DL (ref 0.6–1.1)
ERYTHROCYTE [DISTWIDTH] IN BLOOD BY AUTOMATED COUNT: 13.3 % (ref 11.9–14.6)
GLUCOSE SERPL-MCNC: 97 MG/DL (ref 70–99)
HCT VFR BLD AUTO: 42.8 % (ref 35.8–46.3)
HGB BLD-MCNC: 13.9 G/DL (ref 11.7–15.4)
MAGNESIUM SERPL-MCNC: 2.2 MG/DL (ref 1.8–2.4)
MCH RBC QN AUTO: 32.5 PG (ref 26.1–32.9)
MCHC RBC AUTO-ENTMCNC: 32.5 G/DL (ref 31.4–35)
MCV RBC AUTO: 100 FL (ref 82–102)
NRBC # BLD: 0 K/UL (ref 0–0.2)
PLATELET # BLD AUTO: 196 K/UL (ref 150–450)
PMV BLD AUTO: 9.5 FL (ref 9.4–12.3)
POTASSIUM SERPL-SCNC: 4.1 MMOL/L (ref 3.5–5.1)
RBC # BLD AUTO: 4.28 M/UL (ref 4.05–5.2)
SODIUM SERPL-SCNC: 142 MMOL/L (ref 136–145)
WBC # BLD AUTO: 5.8 K/UL (ref 4.3–11.1)

## 2024-09-12 ENCOUNTER — TELEPHONE (OUTPATIENT)
Age: 89
End: 2024-09-12

## 2024-09-12 DIAGNOSIS — I10 HYPERTENSION: Primary | ICD-10-CM

## 2024-09-19 ENCOUNTER — TELEPHONE (OUTPATIENT)
Age: 89
End: 2024-09-19

## 2024-10-02 DIAGNOSIS — I10 HYPERTENSION: ICD-10-CM

## 2024-10-03 LAB
ANION GAP SERPL CALC-SCNC: 22 MMOL/L (ref 9–18)
BUN SERPL-MCNC: 35 MG/DL (ref 8–23)
CALCIUM SERPL-MCNC: 9.8 MG/DL (ref 8.8–10.2)
CHLORIDE SERPL-SCNC: 103 MMOL/L (ref 98–107)
CO2 SERPL-SCNC: 19 MMOL/L (ref 20–28)
CREAT SERPL-MCNC: 1.27 MG/DL (ref 0.6–1.1)
GLUCOSE SERPL-MCNC: 100 MG/DL (ref 70–99)
POTASSIUM SERPL-SCNC: 4.3 MMOL/L (ref 3.5–5.1)
SODIUM SERPL-SCNC: 144 MMOL/L (ref 136–145)

## 2024-10-07 ENCOUNTER — TELEPHONE (OUTPATIENT)
Age: 89
End: 2024-10-07

## 2024-10-07 NOTE — TELEPHONE ENCOUNTER
Daughter, Parisa ELIZONDO called with results, voiced understanding and thanked me//kelin    ----- Message from Dr. Chapo Hogan MD sent at 10/5/2024  9:03 PM EDT -----  Please call patient.  Labs are reviewed and are acceptable.  Continue current medications.  Call with any questions, concerns or new/worsening cardiac symptoms.

## 2024-10-28 ENCOUNTER — OFFICE VISIT (OUTPATIENT)
Age: 89
End: 2024-10-28
Payer: MEDICARE

## 2024-10-28 VITALS
HEIGHT: 59 IN | DIASTOLIC BLOOD PRESSURE: 70 MMHG | HEART RATE: 76 BPM | BODY MASS INDEX: 26.21 KG/M2 | SYSTOLIC BLOOD PRESSURE: 124 MMHG | WEIGHT: 130 LBS

## 2024-10-28 DIAGNOSIS — I47.10 SUPRAVENTRICULAR TACHYCARDIA (HCC): ICD-10-CM

## 2024-10-28 DIAGNOSIS — R60.0 LOCALIZED EDEMA: Primary | ICD-10-CM

## 2024-10-28 DIAGNOSIS — I10 PRIMARY HYPERTENSION: ICD-10-CM

## 2024-10-28 PROCEDURE — 1159F MED LIST DOCD IN RCRD: CPT | Performed by: INTERNAL MEDICINE

## 2024-10-28 PROCEDURE — G8417 CALC BMI ABV UP PARAM F/U: HCPCS | Performed by: INTERNAL MEDICINE

## 2024-10-28 PROCEDURE — G8484 FLU IMMUNIZE NO ADMIN: HCPCS | Performed by: INTERNAL MEDICINE

## 2024-10-28 PROCEDURE — 1126F AMNT PAIN NOTED NONE PRSNT: CPT | Performed by: INTERNAL MEDICINE

## 2024-10-28 PROCEDURE — 99214 OFFICE O/P EST MOD 30 MIN: CPT | Performed by: INTERNAL MEDICINE

## 2024-10-28 PROCEDURE — 1090F PRES/ABSN URINE INCON ASSESS: CPT | Performed by: INTERNAL MEDICINE

## 2024-10-28 PROCEDURE — G8427 DOCREV CUR MEDS BY ELIG CLIN: HCPCS | Performed by: INTERNAL MEDICINE

## 2024-10-28 PROCEDURE — 1123F ACP DISCUSS/DSCN MKR DOCD: CPT | Performed by: INTERNAL MEDICINE

## 2024-10-28 PROCEDURE — 1036F TOBACCO NON-USER: CPT | Performed by: INTERNAL MEDICINE

## 2024-10-28 RX ORDER — CALCIUM CARBONATE 500(1250)
500 TABLET ORAL DAILY
COMMUNITY

## 2024-10-28 ASSESSMENT — ENCOUNTER SYMPTOMS: SHORTNESS OF BREATH: 0

## 2024-10-28 NOTE — PROGRESS NOTES
[facility] CARDIOLOGY, [address], [address]  [place] [address]  PHONE: [phone]    Livier Chavez  7/24/1925      SUBJECTIVE:   Livier Chavez is a 89 y.o. female seen for a follow up visit regarding the following:     Chief Complaint   Patient presents with    Results     echo    Hypertension       HPI:  Patient presents for follow-up.  Her last visit was August 12.  At this time she was noted to have worsening lower extremity edema.  She was started on Demadex 20 mg a day and her indapamide was stopped.  Labs and echo were ordered.  Her echo showed:    Echo (9/17/2024): EF 59%.  + DD. AVSC.  Mild AR/MR.  Normal IVC    Labs done on October 2 showed stable renal function with creatinine 1.27 and potassium 4.3.  Her edema has largely resolved.  Trivial edema on today's exam.  No palpitations or tachycardia.  No dizziness or syncope.    Past Medical History, Past Surgical History, Family history, Social History, and Medications were all reviewed with the patient today and updated as necessary.           Current Outpatient Medications:     calcium carbonate (OSCAL) 500 MG TABS tablet, Take 1 tablet by mouth daily, Disp: , Rfl:     torsemide (DEMADEX) 20 MG tablet, Take 1 tablet by mouth daily, Disp: 90 tablet, Rfl: 3    alendronate (FOSAMAX) 70 MG tablet, , Disp: , Rfl:     ferrous sulfate (IRON 325) 325 (65 Fe) MG tablet, Take 1 tablet by mouth daily, Disp: , Rfl:     potassium chloride (KLOR-CON) 10 MEQ extended release tablet, TAKE 2 TABLETS TWICE A DAY, Disp: 360 tablet, Rfl: 3    acetaminophen (TYLENOL) 500 MG tablet, Take 2 tablets by mouth daily, Disp: , Rfl:     amiodarone (CORDARONE) 200 MG tablet, Take 0.5 tablets by mouth daily, Disp: 45 tablet, Rfl: 3    loperamide (IMODIUM) 2 MG capsule, Take 1 capsule by mouth 4 times daily as needed for Diarrhea, Disp: , Rfl:     amLODIPine (NORVASC) 5 MG tablet, Take 1 tablet by mouth daily, Disp: , Rfl:     vitamin D 25 MCG (1000 UT) CAPS, Take

## 2024-12-26 RX ORDER — AMIODARONE HYDROCHLORIDE 200 MG/1
100 TABLET ORAL DAILY
Qty: 45 TABLET | Refills: 3 | Status: SHIPPED | OUTPATIENT
Start: 2024-12-26

## 2024-12-26 NOTE — TELEPHONE ENCOUNTER
Prescription sent to [facility]  Requested Prescriptions     Signed Prescriptions Disp Refills    amiodarone (CORDARONE) 200 MG tablet 45 tablet 3     Sig: TAKE ONE-HALF (1/2) TABLET DAILY     Authorizing Provider: CHAD LUNDBERG     Ordering User: JENNIFFER CRUMP

## 2025-04-14 RX ORDER — POTASSIUM CHLORIDE 750 MG/1
20 TABLET, EXTENDED RELEASE ORAL 2 TIMES DAILY
Qty: 360 TABLET | Refills: 3 | Status: SHIPPED | OUTPATIENT
Start: 2025-04-14

## 2025-04-14 NOTE — TELEPHONE ENCOUNTER
Prescription sent to [facility]  Requested Prescriptions     Signed Prescriptions Disp Refills    potassium chloride (KLOR-CON) 10 MEQ extended release tablet 360 tablet 3     Sig: TAKE 2 TABLETS TWICE A DAY     Authorizing Provider: CHAD LUNDBERG     Ordering User: JENNIFFER CRUMP

## 2025-04-28 ENCOUNTER — OFFICE VISIT (OUTPATIENT)
Age: 89
End: 2025-04-28
Payer: MEDICARE

## 2025-04-28 VITALS
DIASTOLIC BLOOD PRESSURE: 60 MMHG | BODY MASS INDEX: 27.26 KG/M2 | SYSTOLIC BLOOD PRESSURE: 136 MMHG | HEIGHT: 59 IN | WEIGHT: 135.2 LBS | HEART RATE: 68 BPM

## 2025-04-28 DIAGNOSIS — I47.10 SUPRAVENTRICULAR TACHYCARDIA: Primary | ICD-10-CM

## 2025-04-28 DIAGNOSIS — E03.9 HYPOTHYROIDISM, UNSPECIFIED TYPE: ICD-10-CM

## 2025-04-28 DIAGNOSIS — I10 PRIMARY HYPERTENSION: ICD-10-CM

## 2025-04-28 DIAGNOSIS — R60.0 LOCALIZED EDEMA: ICD-10-CM

## 2025-04-28 PROCEDURE — G8417 CALC BMI ABV UP PARAM F/U: HCPCS | Performed by: INTERNAL MEDICINE

## 2025-04-28 PROCEDURE — 1036F TOBACCO NON-USER: CPT | Performed by: INTERNAL MEDICINE

## 2025-04-28 PROCEDURE — 93000 ELECTROCARDIOGRAM COMPLETE: CPT | Performed by: INTERNAL MEDICINE

## 2025-04-28 PROCEDURE — 1090F PRES/ABSN URINE INCON ASSESS: CPT | Performed by: INTERNAL MEDICINE

## 2025-04-28 PROCEDURE — 1123F ACP DISCUSS/DSCN MKR DOCD: CPT | Performed by: INTERNAL MEDICINE

## 2025-04-28 PROCEDURE — G8427 DOCREV CUR MEDS BY ELIG CLIN: HCPCS | Performed by: INTERNAL MEDICINE

## 2025-04-28 PROCEDURE — 1159F MED LIST DOCD IN RCRD: CPT | Performed by: INTERNAL MEDICINE

## 2025-04-28 PROCEDURE — 99214 OFFICE O/P EST MOD 30 MIN: CPT | Performed by: INTERNAL MEDICINE

## 2025-04-28 ASSESSMENT — ENCOUNTER SYMPTOMS: SHORTNESS OF BREATH: 0

## 2025-04-28 NOTE — PROGRESS NOTES
[facility] CARDIOLOGY, [address], [address]  [place] [address]  PHONE: [phone]    Livier Chavez  7/24/1925      SUBJECTIVE:   Livier Chavez is a 89 y.o. female seen for a follow up visit regarding the following:     Chief Complaint   Patient presents with    Hypertension       HPI:    Livier Chavez presents for routine follow. Multiple issues addressed as outlined below:     SVT  Status:  Patient denies any recent palpitations or tachycardia.  Tolerating low-dose amiodarone.  Medications:  Amiodarone 200 mg 1/2 tablet QD.   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 Low mg and K. Started on amiodarone.   a. Echo: Normal LV function. EF 55-60%. Grade I diastolic dysfunction. Mild LAE.  2.  Echo (9/17/2024): EF 59%.  + DD. AVSC.  Mild AR/MR.  Normal IVC    Hypertension  Status:  Ambulatory BP readings have been controlled.  Patient reports compliance with medical therapy without side effects.    Medications: Norvasc 5 mg QD.   Labs: Had labs in October which showed normal CMP.  BUN/creatinine was 37 and 1.1 respectively    Edema  Status: Mild edema.  Overall well-controlled with Demadex therapy.  Medications: Demadex 20 mg QD       Hypothyroidism  Status: Her last TSH was 3.73 on April 1, 2025.  Medications: Synthroid 137 mcg QD.     Miscellaneous: Her 89th birthday is on [birth year].  Plans to have a significant celebration with 40-50 family members.      EKG done today and reviewed with patient showed:  Sinus Rhythm   -Poor R-wave progression  Rate 68      Past Medical History, Past Surgical History, Family history, Social History, and Medications were all reviewed with the patient today and updated as necessary.           Current Outpatient Medications:     potassium chloride (KLOR-CON) 10 MEQ extended release tablet, TAKE 2 TABLETS TWICE A DAY, Disp: 360 tablet, Rfl: 3    amiodarone (CORDARONE) 200 MG tablet, TAKE ONE-HALF

## 2025-07-23 DIAGNOSIS — R60.0 LOCALIZED EDEMA: ICD-10-CM

## 2025-07-23 RX ORDER — TORSEMIDE 20 MG/1
20 TABLET ORAL DAILY
Qty: 90 TABLET | Refills: 3 | Status: SHIPPED | OUTPATIENT
Start: 2025-07-23

## 2025-07-23 NOTE — TELEPHONE ENCOUNTER
Prescription sent to [facility]  Requested Prescriptions     Signed Prescriptions Disp Refills    torsemide (DEMADEX) 20 MG tablet 90 tablet 3     Sig: TAKE 1 TABLET DAILY     Authorizing Provider: CHAD LUNDBERG     Ordering User: JENNIFFER CRUMP

## (undated) DEVICE — KIT MRK DEL NAVIGATION CATH L125CM OD0.070IN ID0.040IN

## (undated) DEVICE — KENDALL RADIOLUCENT FOAM MONITORING ELECTRODE RECTANGULAR SHAPE: Brand: KENDALL

## (undated) DEVICE — FORCEPS BX WRK L110MM CHN L2MM DIA1.7MM SUPERDIMENSION

## (undated) DEVICE — NEEDLE ASPIR L130MM OD21GA 2MM WRK CHN FOR BX

## (undated) DEVICE — SET EXTN L6IN W/ S STL CLMP

## (undated) DEVICE — (D)SYR 10ML 1/5ML GRAD NSAF -- PKGING CHANGE USE ITEM 338027

## (undated) DEVICE — Device

## (undated) DEVICE — Device: Brand: BALLOON

## (undated) DEVICE — SINGLE USE SUCTION VALVE MAJ-209: Brand: SINGLE USE SUCTION VALVE (STERILE)

## (undated) DEVICE — MARKER RAD L7MM DIA4MM NIT COIL SUPERLOCK

## (undated) DEVICE — SINGLE USE BIOPSY VALVE MAJ-210: Brand: SINGLE USE BIOPSY VALVE (STERILE)

## (undated) DEVICE — PATCH SENS PT FOR ELECTROMAGNETIC NAVIGATION BRONCHSCP SYS

## (undated) DEVICE — SOL INJ SOD CL0.9% 10ML PREFIL --

## (undated) DEVICE — KIT PROC W/ 90DEG FIRM TIP EXT WRK CHN LOCATABLE GUID FOR

## (undated) DEVICE — BRUSH CYTO L115CM DIA1.9MM 3 NDL PULM USE SUPERDIMENSION

## (undated) DEVICE — BRONCHOSCOPY PACK: Brand: MEDLINE INDUSTRIES, INC.